# Patient Record
Sex: MALE | Race: WHITE | NOT HISPANIC OR LATINO | Employment: FULL TIME | URBAN - METROPOLITAN AREA
[De-identification: names, ages, dates, MRNs, and addresses within clinical notes are randomized per-mention and may not be internally consistent; named-entity substitution may affect disease eponyms.]

---

## 2017-09-14 ENCOUNTER — COMMUNICATION - HEALTHEAST (OUTPATIENT)
Dept: ADMINISTRATIVE | Facility: CLINIC | Age: 47
End: 2017-09-14

## 2017-09-15 ENCOUNTER — OFFICE VISIT - HEALTHEAST (OUTPATIENT)
Dept: FAMILY MEDICINE | Facility: CLINIC | Age: 47
End: 2017-09-15

## 2017-09-15 DIAGNOSIS — F17.211 CIGARETTE NICOTINE DEPENDENCE IN REMISSION: ICD-10-CM

## 2017-09-15 DIAGNOSIS — E66.811 OBESITY (BMI 30.0-34.9): ICD-10-CM

## 2017-09-15 DIAGNOSIS — Z00.00 ROUTINE GENERAL MEDICAL EXAMINATION AT A HEALTH CARE FACILITY: ICD-10-CM

## 2017-09-15 DIAGNOSIS — R05.9 COUGH: ICD-10-CM

## 2017-09-15 LAB
CHOLEST SERPL-MCNC: 198 MG/DL
FASTING STATUS PATIENT QL REPORTED: NO
HDLC SERPL-MCNC: 37 MG/DL
LDLC SERPL CALC-MCNC: 135 MG/DL
TRIGL SERPL-MCNC: 132 MG/DL

## 2017-09-15 ASSESSMENT — MIFFLIN-ST. JEOR: SCORE: 2007.02

## 2017-09-16 ENCOUNTER — COMMUNICATION - HEALTHEAST (OUTPATIENT)
Dept: FAMILY MEDICINE | Facility: CLINIC | Age: 47
End: 2017-09-16

## 2017-09-18 ENCOUNTER — COMMUNICATION - HEALTHEAST (OUTPATIENT)
Dept: FAMILY MEDICINE | Facility: CLINIC | Age: 47
End: 2017-09-18

## 2019-02-19 ENCOUNTER — TRANSFERRED RECORDS (OUTPATIENT)
Dept: HEALTH INFORMATION MANAGEMENT | Facility: CLINIC | Age: 49
End: 2019-02-19

## 2019-02-19 ENCOUNTER — OFFICE VISIT - HEALTHEAST (OUTPATIENT)
Dept: FAMILY MEDICINE | Facility: CLINIC | Age: 49
End: 2019-02-19

## 2019-02-19 DIAGNOSIS — Z00.00 ROUTINE GENERAL MEDICAL EXAMINATION AT A HEALTH CARE FACILITY: ICD-10-CM

## 2019-02-19 DIAGNOSIS — M75.51 BURSITIS OF RIGHT SHOULDER: ICD-10-CM

## 2019-02-19 DIAGNOSIS — E66.811 OBESITY (BMI 30.0-34.9): ICD-10-CM

## 2019-02-19 DIAGNOSIS — F17.210 CIGARETTE NICOTINE DEPENDENCE WITHOUT COMPLICATION: ICD-10-CM

## 2019-02-19 DIAGNOSIS — Z23 NEED FOR VACCINATION: ICD-10-CM

## 2019-02-19 DIAGNOSIS — R93.89 ABNORMAL FINDING ON CHEST XRAY: ICD-10-CM

## 2019-02-19 DIAGNOSIS — R73.9 HYPERGLYCEMIA: ICD-10-CM

## 2019-02-19 LAB
CHOLEST SERPL-MCNC: 224 MG/DL
FASTING STATUS PATIENT QL REPORTED: YES
FASTING STATUS PATIENT QL REPORTED: YES
GLUCOSE BLD-MCNC: 231 MG/DL (ref 70–99)
HDLC SERPL-MCNC: 41 MG/DL
LDLC SERPL CALC-MCNC: 148 MG/DL
LDLC SERPL CALC-MCNC: 148 MG/DL
TRIGL SERPL-MCNC: 173 MG/DL

## 2019-02-19 ASSESSMENT — MIFFLIN-ST. JEOR: SCORE: 2003.85

## 2019-02-20 LAB — HBA1C MFR BLD: 11.7 % (ref 3.5–6)

## 2019-02-25 ENCOUNTER — OFFICE VISIT - HEALTHEAST (OUTPATIENT)
Dept: FAMILY MEDICINE | Facility: CLINIC | Age: 49
End: 2019-02-25

## 2019-02-25 ENCOUNTER — COMMUNICATION - HEALTHEAST (OUTPATIENT)
Dept: FAMILY MEDICINE | Facility: CLINIC | Age: 49
End: 2019-02-25

## 2019-02-25 DIAGNOSIS — E11.65 TYPE 2 DIABETES MELLITUS WITH HYPERGLYCEMIA, WITHOUT LONG-TERM CURRENT USE OF INSULIN (H): ICD-10-CM

## 2019-02-25 DIAGNOSIS — E66.811 OBESITY (BMI 30.0-34.9): ICD-10-CM

## 2019-02-25 LAB
ANION GAP SERPL CALCULATED.3IONS-SCNC: 13 MMOL/L (ref 5–18)
BUN SERPL-MCNC: 14 MG/DL (ref 8–22)
CALCIUM SERPL-MCNC: 9.7 MG/DL (ref 8.5–10.5)
CHLORIDE BLD-SCNC: 104 MMOL/L (ref 98–107)
CO2 SERPL-SCNC: 23 MMOL/L (ref 22–31)
CREAT SERPL-MCNC: 0.97 MG/DL (ref 0.7–1.3)
CREAT UR-MCNC: 251.6 MG/DL
GFR SERPL CREATININE-BSD FRML MDRD: >60 ML/MIN/1.73M2
GLUCOSE BLD-MCNC: 157 MG/DL (ref 70–125)
MICROALBUMIN UR-MCNC: 1.9 MG/DL (ref 0–1.99)
MICROALBUMIN/CREAT UR: 7.6 MG/G
POTASSIUM BLD-SCNC: 3.8 MMOL/L (ref 3.5–5)
SODIUM SERPL-SCNC: 140 MMOL/L (ref 136–145)

## 2019-02-25 ASSESSMENT — MIFFLIN-ST. JEOR: SCORE: 2003.85

## 2019-06-17 ENCOUNTER — OFFICE VISIT - HEALTHEAST (OUTPATIENT)
Dept: FAMILY MEDICINE | Facility: CLINIC | Age: 49
End: 2019-06-17

## 2019-06-17 ENCOUNTER — TRANSFERRED RECORDS (OUTPATIENT)
Dept: HEALTH INFORMATION MANAGEMENT | Facility: CLINIC | Age: 49
End: 2019-06-17

## 2019-06-17 DIAGNOSIS — G89.29 CHRONIC LEFT SHOULDER PAIN: ICD-10-CM

## 2019-06-17 DIAGNOSIS — E11.65 TYPE 2 DIABETES MELLITUS WITH HYPERGLYCEMIA, WITHOUT LONG-TERM CURRENT USE OF INSULIN (H): ICD-10-CM

## 2019-06-17 DIAGNOSIS — M25.512 CHRONIC LEFT SHOULDER PAIN: ICD-10-CM

## 2019-06-17 DIAGNOSIS — Z12.11 SCREENING FOR COLON CANCER: ICD-10-CM

## 2019-06-17 LAB
HBA1C MFR BLD: 7 % (ref 3.5–6)
HBA1C MFR BLD: 7 % (ref 3.5–6)

## 2019-06-17 ASSESSMENT — MIFFLIN-ST. JEOR: SCORE: 1968.47

## 2019-06-24 ENCOUNTER — RECORDS - HEALTHEAST (OUTPATIENT)
Dept: ADMINISTRATIVE | Facility: OTHER | Age: 49
End: 2019-06-24

## 2019-07-01 ENCOUNTER — COMMUNICATION - HEALTHEAST (OUTPATIENT)
Dept: FAMILY MEDICINE | Facility: CLINIC | Age: 49
End: 2019-07-01

## 2019-07-01 ENCOUNTER — OFFICE VISIT - HEALTHEAST (OUTPATIENT)
Dept: PHYSICAL THERAPY | Facility: REHABILITATION | Age: 49
End: 2019-07-01

## 2019-07-01 DIAGNOSIS — E11.65 TYPE 2 DIABETES MELLITUS WITH HYPERGLYCEMIA, WITHOUT LONG-TERM CURRENT USE OF INSULIN (H): ICD-10-CM

## 2019-07-01 DIAGNOSIS — M25.512 LEFT SHOULDER PAIN: ICD-10-CM

## 2019-07-01 DIAGNOSIS — M25.612 DECREASED ROM OF LEFT SHOULDER: ICD-10-CM

## 2019-07-03 ENCOUNTER — COMMUNICATION - HEALTHEAST (OUTPATIENT)
Dept: FAMILY MEDICINE | Facility: CLINIC | Age: 49
End: 2019-07-03

## 2019-07-22 ENCOUNTER — OFFICE VISIT (OUTPATIENT)
Dept: FAMILY MEDICINE | Facility: CLINIC | Age: 49
End: 2019-07-22
Payer: COMMERCIAL

## 2019-07-22 VITALS
WEIGHT: 239 LBS | OXYGEN SATURATION: 96 % | DIASTOLIC BLOOD PRESSURE: 76 MMHG | SYSTOLIC BLOOD PRESSURE: 118 MMHG | HEART RATE: 75 BPM | TEMPERATURE: 97.6 F | RESPIRATION RATE: 16 BRPM

## 2019-07-22 DIAGNOSIS — M25.512 CHRONIC LEFT SHOULDER PAIN: Primary | ICD-10-CM

## 2019-07-22 DIAGNOSIS — E11.9 TYPE 2 DIABETES MELLITUS WITHOUT COMPLICATION, WITHOUT LONG-TERM CURRENT USE OF INSULIN (H): ICD-10-CM

## 2019-07-22 DIAGNOSIS — G89.29 CHRONIC LEFT SHOULDER PAIN: Primary | ICD-10-CM

## 2019-07-22 PROBLEM — F17.210 CIGARETTE NICOTINE DEPENDENCE WITHOUT COMPLICATION: Status: ACTIVE | Noted: 2017-09-19

## 2019-07-22 PROBLEM — E11.65 TYPE 2 DIABETES MELLITUS WITH HYPERGLYCEMIA, WITHOUT LONG-TERM CURRENT USE OF INSULIN (H): Status: ACTIVE | Noted: 2019-02-25

## 2019-07-22 PROBLEM — E66.811 OBESITY (BMI 30.0-34.9): Status: ACTIVE | Noted: 2017-09-19

## 2019-07-22 PROCEDURE — 99214 OFFICE O/P EST MOD 30 MIN: CPT | Performed by: FAMILY MEDICINE

## 2019-07-22 RX ORDER — ASPIRIN 81 MG/1
81 TABLET ORAL
COMMUNITY
Start: 2019-02-25 | End: 2022-10-27

## 2019-07-22 RX ORDER — ATORVASTATIN CALCIUM 10 MG/1
10 TABLET, FILM COATED ORAL DAILY
Refills: 3 | COMMUNITY
Start: 2019-05-29 | End: 2022-10-27

## 2019-07-22 RX ORDER — METFORMIN HCL 500 MG
TABLET, EXTENDED RELEASE 24 HR ORAL
Qty: 90 TABLET | Refills: 3 | Status: SHIPPED | OUTPATIENT
Start: 2019-07-22 | End: 2021-12-14

## 2019-07-22 RX ORDER — METFORMIN HCL 500 MG
TABLET, EXTENDED RELEASE 24 HR ORAL
Refills: 3 | COMMUNITY
Start: 2019-07-01 | End: 2019-07-22

## 2019-07-22 ASSESSMENT — PAIN SCALES - GENERAL: PAINLEVEL: MILD PAIN (2)

## 2019-07-22 ASSESSMENT — PATIENT HEALTH QUESTIONNAIRE - PHQ9
SUM OF ALL RESPONSES TO PHQ QUESTIONS 1-9: 6
5. POOR APPETITE OR OVEREATING: NOT AT ALL

## 2019-07-22 ASSESSMENT — ANXIETY QUESTIONNAIRES
3. WORRYING TOO MUCH ABOUT DIFFERENT THINGS: NOT AT ALL
GAD7 TOTAL SCORE: 0
1. FEELING NERVOUS, ANXIOUS, OR ON EDGE: NOT AT ALL
7. FEELING AFRAID AS IF SOMETHING AWFUL MIGHT HAPPEN: NOT AT ALL
IF YOU CHECKED OFF ANY PROBLEMS ON THIS QUESTIONNAIRE, HOW DIFFICULT HAVE THESE PROBLEMS MADE IT FOR YOU TO DO YOUR WORK, TAKE CARE OF THINGS AT HOME, OR GET ALONG WITH OTHER PEOPLE: NOT DIFFICULT AT ALL
5. BEING SO RESTLESS THAT IT IS HARD TO SIT STILL: NOT AT ALL
6. BECOMING EASILY ANNOYED OR IRRITABLE: NOT AT ALL
2. NOT BEING ABLE TO STOP OR CONTROL WORRYING: NOT AT ALL

## 2019-07-22 NOTE — NURSING NOTE
Chief Complaint   Patient presents with     Establish Care     He had perviously been seen at [facility] clinic in [place]      Shoulder Pain     intermittent ache/left shoulder pain, limited mobility. x5 months, no known injury. Current pain score 2/10       Initial /76   Pulse 75   Temp 97.6  F (36.4  C) (Tympanic)   Resp 16   Wt 108.4 kg (239 lb)   SpO2 96%  There is no height or weight on file to calculate BMI.    Medication Reconciliation: complete  Kanchan Brewster MA

## 2019-07-22 NOTE — PROGRESS NOTES
Subjective     Joe Lima is a 49 year old male who presents to clinic today for the following health issues:    Patient is a 49-year-old male who comes in today to establish care.  He has a past medical history significant for type 2 diabetes which was diagnosed earlier on this year.  He is on metformin 500 mg daily and his last A1c was done June 2019 and this  7.1.      He comes in today for chronic left shoulder pain. This pain has been ongoing for months. He reports reduced range of motion.  He does not recall any trauma.  Sleeping on his left shoulder has become problematic for him at night.  He occasionally has numbness and tingling down his arm.  He did mention that he had a mild injury few months ago when he was grilling and the drill burned his hand making his hand willa backwards and he heard a  snap.  No swelling or confusion of the shoulder.    Patient is otherwise healthy he is also requesting refills on his metformin he has tolerated this thus far.    Chief Complaint   Patient presents with     [facility] Care     He had perviously been seen at [facility] clinic in [place]      Shoulder Pain     intermittent ache/left shoulder pain, limited mobility. x5 months, no known injury. Current pain score 2/10     Shoulder Pain    Onset: x5 months     Description:   Location: left shoulder   Character: Sharp and Dull ache    Intensity: moderate, 2/10    Progression of Symptoms: same    Accompanying Signs & Symptoms:  Other symptoms: none    History:   Previous similar pain: no       Precipitating factors:   Trauma or overuse: no     Alleviating factors:  Improved by: nothing    Therapies Tried and outcome: ibuprofen         Patient Active Problem List   Diagnosis     Bursitis of right shoulder     Cigarette nicotine dependence without complication     Obesity (BMI 30.0-34.9)     Type 2 diabetes mellitus with hyperglycemia, without long-term current use of insulin (H)     History reviewed. No pertinent  surgical history.    Social History     Tobacco Use     Smoking status: Current Every Day Smoker     Types: Other     Smokeless tobacco: Current User   Substance Use Topics     Alcohol use: Yes     History reviewed. No pertinent family history.      Current Outpatient Medications   Medication Sig Dispense Refill     aspirin 81 MG EC tablet Take 81 mg by mouth       metFORMIN (GLUCOPHAGE-XR) 500 MG 24 hr tablet TAKE 1 TABLET BY MOUTH ONCE DAILY WITH BREAKFAST 90 tablet 3     atorvastatin (LIPITOR) 10 MG tablet Take 10 mg by mouth daily  3     No Known Allergies  No lab results found.   BP Readings from Last 3 Encounters:   07/22/19 118/76    Wt Readings from Last 3 Encounters:   07/22/19 108.4 kg (239 lb)                      Reviewed and updated as needed this visit by Provider  Tobacco  Allergies  Meds  Problems  Med Hx  Surg Hx  Fam Hx       Review of Systems   ROS COMP: Constitutional, HEENT, cardiovascular, pulmonary, gi and gu systems are negative, except as otherwise noted.      Objective    /76   Pulse 75   Temp 97.6  F (36.4  C) (Tympanic)   Resp 16   Wt 108.4 kg (239 lb)   SpO2 96%   There is no height or weight on file to calculate BMI.  Physical Exam   GENERAL: healthy, alert and no distress  EYES: Eyes grossly normal to inspection, PERRL and conjunctivae and sclerae normal  HENT: ear canals and TM's normal, nose and mouth without ulcers or lesions  NECK: no adenopathy, no asymmetry, masses, or scars and thyroid normal to palpation  RESP: lungs clear to auscultation - no rales, rhonchi or wheezes  CV: regular rate and rhythm, normal S1 S2, no S3 or S4, no murmur, click or rub, no peripheral edema and peripheral pulses strong  MS: decreased range of motion of left shoulder     Diagnostic Test Results:    Pending         Assessment & Plan     1. Chronic left shoulder pain  MRI ordered. Patient will be notified of results   - MR Shoulder Left w/o Contrast; Future    2. Type 2 diabetes  mellitus without complication, without long-term current use of insulin (H)  Medication refilled   - **A1C FUTURE 3mo; Future  - metFORMIN (GLUCOPHAGE-XR) 500 MG 24 hr tablet; TAKE 1 TABLET BY MOUTH ONCE DAILY WITH BREAKFAST  Dispense: 90 tablet; Refill: 3           FUTURE APPOINTMENTS:       - Follow-up visit in 2-3 months   There are no Patient Instructions on file for this visit.    Return in about 2 months (around 9/22/2019) for Lab Work.    Javier Avila MD  [facility] [place]

## 2019-07-23 ASSESSMENT — ANXIETY QUESTIONNAIRES: GAD7 TOTAL SCORE: 0

## 2019-07-29 ENCOUNTER — COMMUNICATION - HEALTHEAST (OUTPATIENT)
Dept: PHYSICAL THERAPY | Facility: REHABILITATION | Age: 49
End: 2019-07-29

## 2019-08-26 ENCOUNTER — COMMUNICATION - HEALTHEAST (OUTPATIENT)
Dept: PHYSICAL THERAPY | Facility: REHABILITATION | Age: 49
End: 2019-08-26

## 2020-04-14 ENCOUNTER — OFFICE VISIT - HEALTHEAST (OUTPATIENT)
Dept: FAMILY MEDICINE | Facility: CLINIC | Age: 50
End: 2020-04-14

## 2020-04-14 DIAGNOSIS — R05.9 COUGH: ICD-10-CM

## 2020-04-15 ENCOUNTER — RECORDS - HEALTHEAST (OUTPATIENT)
Dept: LAB | Facility: CLINIC | Age: 50
End: 2020-04-15

## 2020-04-15 LAB
SARS-COV-2 PCR COMMENT: NORMAL
SARS-COV-2 RNA SPEC QL NAA+PROBE: NEGATIVE
SARS-COV-2 VIRUS SPECIMEN SOURCE: NORMAL

## 2020-05-26 ENCOUNTER — COMMUNICATION - HEALTHEAST (OUTPATIENT)
Dept: FAMILY MEDICINE | Facility: CLINIC | Age: 50
End: 2020-05-26

## 2020-12-09 ENCOUNTER — OFFICE VISIT - HEALTHEAST (OUTPATIENT)
Dept: FAMILY MEDICINE | Facility: CLINIC | Age: 50
End: 2020-12-09

## 2020-12-09 DIAGNOSIS — F17.210 CIGARETTE NICOTINE DEPENDENCE WITHOUT COMPLICATION: ICD-10-CM

## 2020-12-09 DIAGNOSIS — E11.65 TYPE 2 DIABETES MELLITUS WITH HYPERGLYCEMIA, WITHOUT LONG-TERM CURRENT USE OF INSULIN (H): ICD-10-CM

## 2020-12-09 DIAGNOSIS — E66.811 OBESITY (BMI 30.0-34.9): ICD-10-CM

## 2020-12-09 DIAGNOSIS — N48.89 PENILE CURVATURE, ACQUIRED: ICD-10-CM

## 2020-12-09 LAB
ALBUMIN SERPL-MCNC: 4.4 G/DL (ref 3.5–5)
ALP SERPL-CCNC: 76 U/L (ref 45–120)
ALT SERPL W P-5'-P-CCNC: 43 U/L (ref 0–45)
ANION GAP SERPL CALCULATED.3IONS-SCNC: 9 MMOL/L (ref 5–18)
AST SERPL W P-5'-P-CCNC: 25 U/L (ref 0–40)
BILIRUB SERPL-MCNC: 0.6 MG/DL (ref 0–1)
BUN SERPL-MCNC: 16 MG/DL (ref 8–22)
CALCIUM SERPL-MCNC: 9.4 MG/DL (ref 8.5–10.5)
CHLORIDE BLD-SCNC: 105 MMOL/L (ref 98–107)
CHOLEST SERPL-MCNC: 212 MG/DL
CO2 SERPL-SCNC: 26 MMOL/L (ref 22–31)
CREAT SERPL-MCNC: 1.02 MG/DL (ref 0.7–1.3)
CREAT UR-MCNC: 181.7 MG/DL
FASTING STATUS PATIENT QL REPORTED: YES
GFR SERPL CREATININE-BSD FRML MDRD: >60 ML/MIN/1.73M2
GLUCOSE BLD-MCNC: 143 MG/DL (ref 70–125)
HBA1C MFR BLD: 7.1 %
HDLC SERPL-MCNC: 38 MG/DL
LDLC SERPL CALC-MCNC: 143 MG/DL
MICROALBUMIN UR-MCNC: 1.12 MG/DL (ref 0–1.99)
MICROALBUMIN/CREAT UR: 6.2 MG/G
POTASSIUM BLD-SCNC: 4.5 MMOL/L (ref 3.5–5)
PROT SERPL-MCNC: 7.4 G/DL (ref 6–8)
SODIUM SERPL-SCNC: 140 MMOL/L (ref 136–145)
TRIGL SERPL-MCNC: 156 MG/DL

## 2020-12-09 ASSESSMENT — MIFFLIN-ST. JEOR: SCORE: 1977.54

## 2021-05-30 NOTE — TELEPHONE ENCOUNTER
Spoke with pt who states he has not had an Xray done on his shoulder. Pt requested 7/15 appt after 1230. Pt was scheduled on 7/15 at 1340 with DE.

## 2021-05-30 NOTE — TELEPHONE ENCOUNTER
RN cannot approve Refill Request    RN can NOT refill this medication overdue for office visits and/or labs.    Marcelino Frias, [facility] Triage/Med Refill 7/1/2019    Requested Prescriptions   Pending Prescriptions Disp Refills     metFORMIN (GLUCOPHAGE-XR) 500 MG 24 hr tablet [Pharmacy Med Name: MetFORMIN ER 500MG  TAB] 30 tablet 3     Sig: TAKE 1 TABLET BY MOUTH ONCE DAILY WITH BREAKFAST       Metformin Refill Protocol Failed - 7/1/2019  1:31 PM        Failed - LFT or AST or ALT in last 12 months     No results found for: ALBUMIN, BILITOT, BILIDIR, ALKPHOS, AST, ALT, PROT             Passed - Blood pressure in last 12 months     BP Readings from Last 1 Encounters:   06/17/19 128/76             Passed - GFR or Serum Creatinine in last 6 months     GFR MDRD Non Af Amer   Date Value Ref Range Status   02/25/2019 >60 >60 mL/min/1.73m2 Final     GFR MDRD Af Amer   Date Value Ref Range Status   02/25/2019 >60 >60 mL/min/1.73m2 Final             Passed - Visit with PCP or prescribing provider visit in last 6 months or next 3 months     Last office visit with prescriber/PCP: 6/17/2019 OR same dept: 6/17/2019 Marge Brewster MD OR same specialty: 6/17/2019 Marge Brewster MD Last physical: 2/19/2019 Last MTM visit: Visit date not found         Next appt within 3 mo: Visit date not found  Next physical within 3 mo: Visit date not found  Prescriber OR PCP: Marge Brewster MD  Last diagnosis associated with med order: There are no diagnoses linked to this encounter.   If protocol passes may refill for 12 months if within 3 months of last provider visit (or a total of 15 months).           Passed - A1C in last 6 months     Hemoglobin A1c   Date Value Ref Range Status   06/17/2019 7.0 (H) 3.5 - 6.0 % Final               Passed - Microalbumin in last year      Microalbumin, Random Urine   Date Value Ref Range Status   02/25/2019 1.90 0.00 - 1.99 mg/dL Final

## 2021-05-31 VITALS — BODY MASS INDEX: 33.41 KG/M2 | HEIGHT: 72 IN | WEIGHT: 246.7 LBS

## 2021-05-31 NOTE — TELEPHONE ENCOUNTER
Left a message for Joe regarding today's NS (#1) I informed him that he does not have other appointments scheduled. I gave him our phone if had any questions.   Jacinta Werner PTA,CLT

## 2021-06-02 VITALS — HEIGHT: 72 IN | WEIGHT: 246 LBS | BODY MASS INDEX: 33.32 KG/M2

## 2021-06-02 VITALS — WEIGHT: 246 LBS | BODY MASS INDEX: 33.32 KG/M2 | HEIGHT: 72 IN

## 2021-06-03 VITALS — WEIGHT: 238.2 LBS | BODY MASS INDEX: 32.26 KG/M2 | HEIGHT: 72 IN

## 2021-06-05 VITALS
BODY MASS INDEX: 32.53 KG/M2 | WEIGHT: 240.2 LBS | SYSTOLIC BLOOD PRESSURE: 125 MMHG | DIASTOLIC BLOOD PRESSURE: 81 MMHG | HEART RATE: 81 BPM | HEIGHT: 72 IN

## 2021-06-07 NOTE — PROGRESS NOTES
SUBJECTIVE: Here for curbside evaluation for COVID-19 referred through [facility]. Confirmed [facility] with name and date of birth for specimen collection.         1. Cough       Instructed patient to follow up with [facility] for return to work process. If questions or concerns arise advised to follow up with [facility] team.     Kary Rangel

## 2021-06-08 NOTE — TELEPHONE ENCOUNTER
Left message to call back for: Joe  Information to relay to patient:  MINALTCANNE MARIE. Please help pt schedule virtual visit to follow on DM when he calls back.

## 2021-06-13 NOTE — PROGRESS NOTES
Assessment/Plan:     1. Routine general medical examination at a health care facility  Routine history and physical, updated in EMR.  Labs updated as below.  Immunizations are up-to-date per patient.  Plan repeat physical in 1 year.  - Lipid Cascade RANDOM    2. Cough  Likely viral respiratory infection.  Discussed with patient that there is a small area in the lung that we should recheck with another x-ray in 4-6 weeks.  This is possibly a blood vessel on end versus a lymph node versus a small mass.  He will continue to treat symptomatically and discussed over-the-counter medications for cough that he can use.  - XR Chest PA and Lateral  - HM1(CBC and Differential)  - HM1 (CBC with Diff)    3. Cigarette nicotine dependence in remission  Patient recently quit smoking when he became ill.  Congratulated his efforts thus far and recommended continued cessation.  He does not desire any pharmacologic assistance in this regard.  I have counseled the patient for tobacco cessation and the follow up will occur  at the next visit.    4. Obesity (BMI 30.0-34.9)  Discussed a healthy, low carb, low sugar diet and regular cardiovascular exercise.  The following high BMI interventions were performed this visit: encouragement to exercise, weight monitoring, special diet education and lifestyle education regarding diet      Subjective:      Joe Lima is a 47 y.o. male who presents for an annual exam. The patient reports that there is not domestic violence in his life.     Sick for the past 3 days, started with sore throat and nasal congestion/pain.  Drinking lots of fluids, orange juice.  Sinuses feel better, but now with cough and shortness of breath when lying flat.  Mostly non-productive cough.  No history of asthma.    Healthy Habits:   Regular Exercise: No  Sunscreen Use: No  Healthy Diet: sometimes  Dental Visits Regularly: No  Seat Belt: Yes  Sexually active: No  Monthly Self Testicular Exams:  No  Lipid Profile:  No  Glucose Screen: No  Prevention of Osteoporosis: Yes        There is no immunization history on file for this patient.  Immunization status: missing doses of MMR, Tdap.    No exam data present    No current outpatient prescriptions on file.     No current facility-administered medications for this visit.      History reviewed. No pertinent past medical history.  Past Surgical History:   Procedure Laterality Date     arm fracture Left 1977     LUMBAR DISCECTOMY  2005     Review of patient's allergies indicates no known allergies.  Family History   Problem Relation Age of Onset     No Medical Problems Mother      No Medical Problems Father      Cancer Maternal Aunt      Breast cancer Neg Hx      Colon cancer Neg Hx      Prostate cancer Neg Hx      Diabetes Neg Hx      Heart disease Neg Hx      Social History     Social History     Marital status: Single     Spouse name: N/A     Number of children: N/A     Years of education: N/A     Occupational History     Not on file.     Social History Main Topics     Smoking status: Former Smoker     Packs/day: 0.50     Years: 30.00     Quit date: 9/12/2017     Smokeless tobacco: Never Used     Alcohol use 0.0 - 0.6 oz/week     0 - 1 Cans of beer per week      Comment: rare     Drug use: No     Sexual activity: Not Currently     Partners: Female     Other Topics Concern     Not on file     Social History Narrative     No narrative on file       Review of Systems  General:  Denies problem  Eyes: Denies problem  Ears/Nose/Throat: Sinus congestion, nasal drainage  Cardiovascular: Denies problem  Respiratory:  Cough as above  Gastrointestinal:  Denies problem  Genitourinary: Denies problem  Musculoskeletal:  Denies problem  Skin: Denies problem  Neurologic: Denies problem  Psychiatric: Denies problem  Endocrine: Denies problem  Heme/Lymphatic: Denies problem   Allergic/Immunologic: Denies problem        Objective:     Vitals:    09/15/17 1335   BP: 128/86   Pulse: 96   Resp: 24    Temp: 98  F (36.7  C)   TempSrc: Oral   SpO2: 97%   Weight: (!) 246 lb 11.2 oz (111.9 kg)   Height: 6' (1.829 m)     Body mass index is 33.46 kg/(m^2).    Physical  General Appearance: Alert, cooperative, no distress, appears stated age  Head: Normocephalic, without obvious abnormality, atraumatic  Eyes: PERRL, conjunctiva/corneas clear, EOM's intact  Ears: Normal TM's and external ear canals, both ears  Nose: Nares normal, septum midline,mucosa normal, no drainage  Throat: Lips, mucosa, and tongue normal; teeth and gums normal  Neck: Supple, symmetrical, trachea midline, no adenopathy; thyroid: not enlarged, symmetric, no tenderness/mass/nodules; no carotid bruit or JVD  Back: Symmetric, no curvature, ROM normal, no CVA tenderness  Lungs: Clear to auscultation bilaterally, respirations unlabored  Heart: Regular rate and rhythm, S1 and S2 normal, no murmur, rub, or gallop  Abdomen: Soft, non-tender, bowel sounds active all four quadrants,  no masses, no organomegaly  Genitourinary: Penis normal. Right testis is descended. Left testis is descended.   No scrotal masses palpated, prostate is mildly enlarged without nodularity or asymmetry.  Musculoskeletal: Normal range of motion. No joint swelling or deformity.   Extremities: Extremities normal, atraumatic, no cyanosis or edema  Skin: Skin color, texture, turgor normal, no rashes or lesions  Lymph nodes: Cervical, supraclavicular, and axillary nodes normal  Neurologic: He is alert. He has normal reflexes.   Psychiatric: He has a normal mood and affect.

## 2021-06-13 NOTE — PROGRESS NOTES
Assessment/Plan:       1. Type 2 diabetes mellitus with hyperglycemia, without long-term current use of insulin (H)  Patient's A1c is actually stable compared with previous when he was taking his Metformin.  I still recommended restarting Metformin.  Congratulated patient on his lifestyle changes.  Also recommended restarting statin and baby aspirin.  Labs updated as below.  Recommended follow-up in 6 months.  - metFORMIN (GLUCOPHAGE-XR) 500 MG 24 hr tablet; TAKE 1 TABLET BY MOUTH ONCE DAILY WITH BREAKFAST  Dispense: 90 tablet; Refill: 3  - aspirin 81 MG EC tablet; Take 1 tablet (81 mg total) by mouth daily.  Dispense: 150 tablet; Refill: 2  - atorvastatin (LIPITOR) 10 MG tablet; Take 1 tablet (10 mg total) by mouth daily.  Dispense: 90 tablet; Refill: 3  - Glycosylated Hemoglobin A1c  - Comprehensive Metabolic Panel  - Microalbumin, Random Urine  - Lipid Cascade RANDOM    2. Obesity (BMI 30.0-34.9)  Discussed a healthy, low-carb, low sugar diet and regular cardiovascular exercise.  Discussed that Metformin can be beneficial in terms of metabolism as well.  - atorvastatin (LIPITOR) 10 MG tablet; Take 1 tablet (10 mg total) by mouth daily.  Dispense: 90 tablet; Refill: 3  - Comprehensive Metabolic Panel  - Lipid Cascade RANDOM    3. Cigarette nicotine dependence without complication  Discussed the importance of cessation.  Patient is contemplative.  He does not desire any pharmacologic assistance.    4. Penile curvature, acquired  I am unable to appreciate this on examination today, but patient has a concern for Peyronie's disease.  Recommended referral to urology for further evaluation.  - Ambulatory referral to Urology        Subjective:       Joe Lima is a 50 y.o. male who presents for diabetes follow-up and to discuss acquired penile curvature.  It has been about 1.5 years since I last saw the patient.  He states he is not currently taking any medications.  He has been trying to eat healthier, and has  "lost 4 pounds when compared to 2 years ago.  He works as a .  He states he has cut back on smoking, and tries to walk on a treadmill regularly.  He denies any polyuria or polydipsia.  When I last saw him, he was diagnosed with type 2 diabetes mellitus and started on Metformin, statin and baby aspirin.  Again, he is not currently taking any of these medications.  Lastly, he reports that he has had some erectile issues.  He thinks he might have Peyronie's disease after looking this up online.  He feels as though he can feel a \"plaque\" on the dorsal aspect of his penis and feels that this curves upward.  He denies any concern for sexually transmitted infections.  He denies any known injury, woke up with this sensation.    Labs Reviewed:  Lab Results   Component Value Date    WBC 8.5 09/15/2017    HGB 16.7 09/15/2017    HCT 49.0 09/15/2017     09/15/2017     Ref Range & Units 6/17/19 1109      Hemoglobin A1c 3.5 - 6.0 % 7.0High         Ref Range & Units 2/25/19 1201    Sodium 136 - 145 mmol/L 140     Potassium 3.5 - 5.0 mmol/L 3.8     Chloride 98 - 107 mmol/L 104     CO2 22 - 31 mmol/L 23     Anion Gap, Calculation 5 - 18 mmol/L 13     Glucose 70 - 125 mg/dL 157High      Calcium 8.5 - 10.5 mg/dL 9.7     BUN 8 - 22 mg/dL 14     Creatinine 0.70 - 1.30 mg/dL 0.97     GFR MDRD Af Amer >60 mL/min/1.73m2 >60     GFR MDRD Non Af Amer >60 mL/min/1.73m2 >60          The following portions of the patient's history were reviewed and updated as appropriate: allergies, current medications, past medical history, past social history and problem list.      Current Outpatient Medications:      aspirin 81 MG EC tablet, Take 1 tablet (81 mg total) by mouth daily., Disp: 150 tablet, Rfl: 2     atorvastatin (LIPITOR) 10 MG tablet, Take 1 tablet (10 mg total) by mouth daily., Disp: 90 tablet, Rfl: 3     metFORMIN (GLUCOPHAGE-XR) 500 MG 24 hr tablet, TAKE 1 TABLET BY MOUTH ONCE DAILY WITH BREAKFAST, Disp: 30 tablet, Rfl: " 3    Review of Systems   A 12 point comprehensive review of systems was negative except as noted.      Objective:      /81 (Patient Site: Left Arm, Patient Position: Sitting, Cuff Size: Adult Regular)   Pulse 81   Ht 6' (1.829 m)   Wt (!) 240 lb 3.2 oz (109 kg)   BMI 32.58 kg/m      General appearance: alert, appears stated age and cooperative  Head: Normocephalic, without obvious abnormality, atraumatic  Eyes: conjunctivae clear, sclerae anicteric  Lungs: clear to auscultation bilaterally  Heart: regular rate and rhythm, S1, S2 normal, no murmur, click, rub or gallop  Male genitalia: penis appears normal, I don't appreciate any abnormal curvature or plaques on today's examination; testes descended, no scrotal masses palpated  Extremities: extremities normal, atraumatic, no cyanosis or edema  Neurologic: Grossly normal        Results for orders placed or performed in visit on 12/09/20   Glycosylated Hemoglobin A1c   Result Value Ref Range    Hemoglobin A1c 7.1 (H) <=5.6 %   Comprehensive Metabolic Panel   Result Value Ref Range    Sodium 140 136 - 145 mmol/L    Potassium 4.5 3.5 - 5.0 mmol/L    Chloride 105 98 - 107 mmol/L    CO2 26 22 - 31 mmol/L    Anion Gap, Calculation 9 5 - 18 mmol/L    Glucose 143 (H) 70 - 125 mg/dL    BUN 16 8 - 22 mg/dL    Creatinine 1.02 0.70 - 1.30 mg/dL    GFR MDRD Af Amer >60 >60 mL/min/1.73m2    GFR MDRD Non Af Amer >60 >60 mL/min/1.73m2    Bilirubin, Total 0.6 0.0 - 1.0 mg/dL    Calcium 9.4 8.5 - 10.5 mg/dL    Protein, Total 7.4 6.0 - 8.0 g/dL    Albumin 4.4 3.5 - 5.0 g/dL    Alkaline Phosphatase 76 45 - 120 U/L    AST 25 0 - 40 U/L    ALT 43 0 - 45 U/L   Microalbumin, Random Urine   Result Value Ref Range    Microalbumin, Random Urine 1.12 0.00 - 1.99 mg/dL    Creatinine, Urine 181.7 mg/dL    Microalbumin/Creatinine Ratio Random Urine 6.2 <=19.9 mg/g   Lipid Cascade RANDOM   Result Value Ref Range    Cholesterol 212 (H) <=199 mg/dL    Triglycerides 156 (H) <=149 mg/dL    HDL  Cholesterol 38 (L) >=40 mg/dL    LDL Calculated 143 (H) <=129 mg/dL    Patient Fasting > 8hrs? Yes

## 2021-06-17 NOTE — PATIENT INSTRUCTIONS - HE
Patient Instructions by Ira Lema PT at 7/1/2019 10:00 AM     Author: Ira Lema PT Service: -- Author Type: Physical Therapist    Filed: 7/1/2019 11:49 AM Encounter Date: 7/1/2019 Status: Signed    : Ira Lema PT (Physical Therapist)

## 2021-06-24 NOTE — PATIENT INSTRUCTIONS - HE
Keep up the good work with a low carbohydrate, low sugar diet and regular cardiovascular exercise.    Plan follow-up in 3 months for recheck.

## 2021-06-24 NOTE — TELEPHONE ENCOUNTER
Notified pharmacy per Dr. Brewster, the prescription for the lisinopril is to be cancelled. Pharmacy stated understanding.

## 2021-06-24 NOTE — PROGRESS NOTES
Assessment/Plan:       1. Type 2 diabetes mellitus with hyperglycemia, without long-term current use of insulin (H)  This is a new diagnosis for the patient.  He appears very motivated to make lifestyle changes, has already made some significant diet changes.  He is willing to try metformin along with a statin and baby aspirin for heart disease prevention.  Will not ask him to check his blood sugars at this time, but plan follow-up in 3 months for recheck of A1c.  - Microalbumin, Random Urine  - Basic Metabolic Panel    2. Obesity (BMI 30.0-34.9)  Discussed a healthy, low-carb, low sugar diet and regular cardiovascular exercise.  The following high BMI interventions were performed this visit: encouragement to exercise, weight monitoring, special diet education and lifestyle education regarding diet        Subjective:       Joe Lima is a 48 y.o. male who presents for a discussion of a new diagnosis of type II diabetes mellitus.  This was found on screening lab work performed at his recent complete physical exam.  He says that in retrospect, he has noticed some excessive thirst and frequent urination.  He otherwise has been feeling well.  Since being diagnosed with diabetes mellitus, he says he has cut back significantly on his intake of pop and what little he drinks is now diet.  He has gone grocery shopping to find healthier items, and plans to be more active.  He is very motivated to make some lifestyle changes.  He otherwise has been feeling well and has no new questions or concerns today.      Labs Reviewed:  Lab Results   Component Value Date    WBC 8.5 09/15/2017    HGB 16.7 09/15/2017    HCT 49.0 09/15/2017     09/15/2017    CHOL 224 (H) 02/19/2019    TRIG 173 (H) 02/19/2019    HDL 41 02/19/2019    HGBA1C 11.7 (H) 02/19/2019       The following portions of the patient's history were reviewed and updated as appropriate: allergies, current medications, past medical history, past social history and  problem list.    No current outpatient medications on file.    Review of Systems   A 12 point comprehensive review of systems was negative except as noted.      Objective:      /74 (Patient Site: Left Arm, Patient Position: Sitting, Cuff Size: Adult Regular)   Pulse 88   Resp 20   Ht 6' (1.829 m)   Wt (!) 246 lb (111.6 kg)   BMI 33.36 kg/m      General appearance: alert, appears stated age and cooperative  Head: Normocephalic, without obvious abnormality, atraumatic  Eyes: conjunctivae clear, sclerae anicteric  Lungs: clear to auscultation bilaterally  Heart: regular rate and rhythm, S1, S2 normal, no murmur, click, rub or gallop  Extremities: extremities normal, atraumatic, no cyanosis or edema  Neurologic: Grossly normal, monofilament foot exam normal bilaterally        Results for orders placed or performed in visit on 02/25/19   Microalbumin, Random Urine   Result Value Ref Range    Microalbumin, Random Urine 1.90 0.00 - 1.99 mg/dL    Creatinine, Urine 251.6 mg/dL    Microalbumin/Creatinine Ratio Random Urine 7.6 <=19.9 mg/g   Basic Metabolic Panel   Result Value Ref Range    Sodium 140 136 - 145 mmol/L    Potassium 3.8 3.5 - 5.0 mmol/L    Chloride 104 98 - 107 mmol/L    CO2 23 22 - 31 mmol/L    Anion Gap, Calculation 13 5 - 18 mmol/L    Glucose 157 (H) 70 - 125 mg/dL    Calcium 9.7 8.5 - 10.5 mg/dL    BUN 14 8 - 22 mg/dL    Creatinine 0.97 0.70 - 1.30 mg/dL    GFR MDRD Af Amer >60 >60 mL/min/1.73m2    GFR MDRD Non Af Amer >60 >60 mL/min/1.73m2

## 2021-06-24 NOTE — PROGRESS NOTES
"Assessment/Plan:     1. Routine general medical examination at a health care facility  Routine history and physical, updated in EMR.  Fasting labs updated as below.  Immunizations updated today.  Patient defers digital rectal exam for prostate screen until next year.  Plan repeat physical in 1 year.    2. Obesity (BMI 30.0-34.9)  Discussed a healthy, low carb, low sugar diet and regular cardiovascular exercise.  Patient is pre-contemplative about these lifestyle changes at present.  The following high BMI interventions were performed this visit: encouragement to exercise, weight monitoring, special diet education and lifestyle education regarding diet  - Lipid Cascade FASTING  - Glucose    3. Cigarette nicotine dependence without complication  Patient has gone back to smoking.  He does desire assistance with cessation, but does not wish to try Chantix.  We also discussed Zyban.  He would like to try nicotine gum.  This is not covered by his insurance, he will buy this over-the-counter.  I have counseled the patient for tobacco cessation and the follow up will occur  at the next visit.    4. Abnormal finding on chest xray  Patient had a perihilar density on his chest x-ray when we last saw him.  At that time he was having a cough.  This is asymptomatic.  Advised recheck and if this is still present, further imaging with possibly CT scan.  We will inform patient of this result when it returns, patient did not wish to stay to review this result.  - XR Chest 2 Views    5. Bursitis, right shoulder  Patient has had a swollen, what feels like subacromial bursa sac for the past 10 years.  This is not particularly bothersome but occasionally feels like a \"pressure\" sensation.  Recommended referral to orthopedics to see if they would be willing to try draining this.    6. Need for vaccination  - Tdap vaccine,  8yo or older,  IM      Subjective:      Joe Lima is a 48 y.o. male who presents for an annual exam. The patient " reports that there is not domestic violence in his life.  Patient overall has been feeling well.  He does mention that his previous rubella titer returned not immune at his preemployment physical.  He also has a lump on his right shoulder that has been present for about 10 years.  It does not seem to be enlarging, is not particularly painful or bothersome.    Healthy Habits:   Regular Exercise: No  Sunscreen Use: No  Healthy Diet: No  Dental Visits Regularly: No  Seat Belt: Yes  Sexually active: No  Monthly Self Testicular Exams:  Yes  Lipid Profile: Yes  Glucose Screen: Yes  Prevention of Osteoporosis: No        There is no immunization history on file for this patient.  Immunization status: missing doses of MMR, Tdap.    No exam data present    No current outpatient medications on file.     No current facility-administered medications for this visit.      No past medical history on file.  Past Surgical History:   Procedure Laterality Date     arm fracture Left 1977     LUMBAR DISCECTOMY  2005     Patient has no known allergies.  Family History   Problem Relation Age of Onset     No Medical Problems Mother      No Medical Problems Father      Cancer Maternal Aunt      Breast cancer Neg Hx      Colon cancer Neg Hx      Prostate cancer Neg Hx      Diabetes Neg Hx      Heart disease Neg Hx      Social History     Socioeconomic History     Marital status: Single     Spouse name: Not on file     Number of children: Not on file     Years of education: Not on file     Highest education level: Not on file   Social Needs     Financial resource strain: Not on file     Food insecurity - worry: Not on file     Food insecurity - inability: Not on file     Transportation needs - medical: Not on file     Transportation needs - non-medical: Not on file   Occupational History     Not on file   Tobacco Use     Smoking status: Former Smoker     Packs/day: 0.50     Years: 30.00     Pack years: 15.00     Last attempt to quit: 9/12/2017      Years since quittin.4     Smokeless tobacco: Never Used   Substance and Sexual Activity     Alcohol use: Yes     Alcohol/week: 0.0 - 0.6 oz     Comment: rare     Drug use: No     Sexual activity: Not Currently     Partners: Female   Other Topics Concern     Not on file   Social History Narrative     Not on file       Review of Systems  General:  Denies problem  Eyes: Denies problem  Ears/Nose/Throat: Denies problem  Cardiovascular: Denies problem  Respiratory:  Denies problem  Gastrointestinal:  Denies problem  Genitourinary: Denies problem  Musculoskeletal:  lump right shoulder  Skin: Denies problem  Neurologic: Denies problem  Psychiatric: Denies problem  Endocrine: Denies problem  Heme/Lymphatic: Denies problem   Allergic/Immunologic: Denies problem        Objective:     Vitals:    19 1046   BP: 120/80   Pulse: 88   Resp: 20   Weight: (!) 246 lb (111.6 kg)   Height: 6' (1.829 m)     Body mass index is 33.36 kg/m .    Physical  General Appearance: Alert, cooperative, no distress, appears stated age  Head: Normocephalic, without obvious abnormality, atraumatic  Eyes: PERRL, conjunctiva/corneas clear, EOM's intact  Ears: Normal TM's and external ear canals, both ears  Nose: Nares normal, septum midline, mucosa normal, no drainage  Throat: Lips, mucosa, and tongue normal; teeth and gums normal  Neck: Supple, symmetrical, trachea midline, no adenopathy; thyroid: not enlarged, symmetric, no tenderness/mass/nodules; no carotid bruit or JVD  Back: Symmetric, no curvature, ROM normal, no CVA tenderness  Lungs: Clear to auscultation bilaterally, respirations unlabored  Heart: Regular rate and rhythm, S1 and S2 normal, no murmur, rub, or gallop  Abdomen: Soft, non-tender, bowel sounds active all four quadrants, no masses, no organomegaly  Genitourinary: Patient refused.   Musculoskeletal: Normal range of motion.  Large protuberant fluctuant mass right shoulder consistent with inflamed subacromial bursa.  No  warmth or erythema.   Extremities: Extremities normal, atraumatic, no cyanosis or edema  Skin: Skin color, texture, turgor normal, no rashes or lesions  Lymph nodes: Cervical, supraclavicular, and axillary nodes normal  Neurologic: He is alert. He has normal reflexes.   Psychiatric: He has a normal mood and affect.

## 2021-06-27 NOTE — PROGRESS NOTES
Progress Notes by Ira Lema PT at 7/1/2019 10:00 AM     Author: Ira Lema PT Service: -- Author Type: Physical Therapist    Filed: 8/29/2019 10:03 PM Encounter Date: 7/1/2019 Status: Addendum    : Ira Lema PT (Physical Therapist)    Related Notes: Original Note by Ira Lema PT (Physical Therapist) filed at 7/1/2019  1:30 PM       [facility] Discharge Summary  Patient Name: Joe Lima  Date: 8/29/2019         Visit Diagnosis:   1. Left shoulder pain     2. Decreased ROM of left shoulder         Goals:  NOT MET  Pt. will demonstrate/verbalize independence in self-management of condition in : 12 weeks  Pt. will be independent with home exercise program in : 12 weeks  Pt. will report decreased intensity, frequency of : Pain;in 12 weeks  Pt. will have improved quality of sleep: waking less times/night;in 12 weeks  Patient will reach / maintain arm movement: overhead;behind;for home chores;for dressing;with less pain;with less difficulty;in 12 weeks        Patient was seen for 1 visits on 7/1/2019 with 2 missed appointments.  The patient attended therapy initially, but did not finish the therapy sessions prescribed.  Goals were not fully achieved. Explanation for goals not achieved: Pt did not return after the initial evaluation.  Two attempts to contact the patient were made, however the patient has not responded to our attempts.  The patient discontinued therapy, did not return.    Therapy will be discontinued at this time.  The patient will need a new referral to resume.    Thank you for your referral.  Ira Lema  8/29/2019      [facility]   Shoulder Initial Evaluation    Patient Name: Joe Lima  Date of evaluation: 7/1/2019  Referral Diagnosis: Chronic left shoulder pain  Referring provider: Marge Brewster MD     Visit Diagnosis:     ICD-10-CM    1. Left shoulder pain M25.512    2. Decreased ROM of left shoulder M25.612         Assessment:    This 49 year old male presents with left shoulder pain since March, 2019 with insidious onset and worsening symptoms.  Pt has significant splinting with ROM but his strength at baseline is good and does not provoke symptoms.  His symptoms are consistent with idiopathic adhesive capsulitis.  He may benefit from an interarticular injection in conjunction with PT to speed recovery.  Impairments in  pain, ROM, joint mobility  Pt. is appropriate for skilled PT intervention as outlined in the Plan of Care (POC).    Goals:  Pt. will demonstrate/verbalize independence in self-management of condition in : 12 weeks  Pt. will be independent with home exercise program in : 12 weeks  Pt. will report decreased intensity, frequency of : Pain;in 12 weeks  Pt. will have improved quality of sleep: waking less times/night;in 12 weeks  Patient will reach / maintain arm movement: overhead;behind;for home chores;for dressing;with less pain;with less difficulty;in 12 weeks        Patient's expectations/goals are realistic.    Barriers to Learning or Achieving Goals:  No Barriers.       Plan / Patient Instructions:        Plan of Care:   Authorization / Certification Start Date: 07/01/19  Authorization / Certification End Date: 09/29/19  Authorization / Certification Number of Visits: 12  Communication with: Referral Source  Patient Related Instruction: Nature of Condition;Treatment plan and rationale;Self Care instruction;Basis of treatment;Body mechanics;Posture;Precautions;Next steps;Expected outcome  Times per Week: 2-3x/month  Number of Visits: 12  Discharge Planning: to home program  Precautions / Restrictions : smoker, obesity, DMII  Therapeutic Exercise: ROM;Stretching;Strengthening  Neuromuscular Reeducation: posture;kinesio tape;TNE  Manual Therapy: soft tissue mobilization;myofascial release;joint mobilization;muscle energy;strain counterstrain  Modalities: iontophoresis;ultrasound  Functional Training  (ADL's): self care  Equipment: theraband;TENS unit      POC and pathology of condition were reviewed with patient.  Pt. is in agreement with the Plan of Care  A Home Exercise Program (HEP) was initiated today.  Pt. was instructed in exercises by PT and patient was given a handout with detailed instructions.     Plan for next visit:   Check on status of injection  Check ROM  Consider MT/KT for pain  Advance pain free exercises for ROM and strength  .   Subjective:       Social information:       Occupation:Dispatcher for transportation at     Equipment Available: None    History of Present Illness:    Joe is a 49 y.o. male who presents to therapy today with complaints of left shoulder pain. Date of onset/duration of symptoms is March, 2019 when he would put on a jacket.. Onset was gradual. Symptoms are constant and getting worse. He denies history of similar symptoms. He describes their previous level of function as not limited    Pain Rating:3  Pain rating at best: 1  Pain rating at worst: 6; one time it got to 9-10 with a sudden jerk  Pain description: aching and prickily    Functional limitations are described as occurring with:   personal cares donning shirt or jacket  reaching overhead, behind back and washing hair or reaching behind.  sleeping    Patient reports benefit from:  pain medication, cold, changing positions       Objective:      Note: Items left blank indicates the item was not performed or not indicated at the time of the evaluation.    Patient Outcome Measures :    Shoulder Pain and Disability Index (SPADI) in %: 45     Scores range from 0-100%, where a score of 0% represents minimal pain and maximal function. The minimal clincically important difference is a score reduction of 10%.    Involved side: Left  Posture Observation:      General sitting posture is  fair.  General standing posture is fair.  Cervical:  Mild forward head  Shoulder/Thoracic complex: Mild bilateral scapular protraction  "  Cervical Clearing: Normal    Shoulder/Elbow ROM     Date: 7/1/2019     Shoulder and Elbow ROM ( )   AROM in degrees AROM in degrees AROM in degrees    Right Left Right Left Right Left   Shoulder Flexion (0-180 ) 160 100       Shoulder Abduction (0-180 ) 170 65       Shoulder Extension (0-60 ) 60 40       Shoulder ER (0-90 ) 60 25       Shoulder IR (0-70 )         Shoulder IR/Ext T8 Mid buttock       Elbow Flexion (150 ) WNL WNL       Elbow Extension (0 ) WNL WNL        PROM in degrees PROM in degrees PROM in degrees    Right Left Right Left Right Left   Shoulder Flexion (0-180 )  Too guarded to test       Shoulder Abduction (0-180 )  \"       Shoulder Extension (0-60 )  \"       Shoulder ER (0-90 )  \"       Shoulder IR (0-70 )  \"       Elbow Flexion (150 )  WNL       Elbow Extension (0 )  WNL         Shoulder/Elbow Strength WNL at baseline       Joint Assessment:  Sternoclavicular Joint Assessment: WNL  Acromioclavicular Joint Assessment: WNL  Scapulothoracic Joint Assessment: WNL.  Glenohumeral Joint Assessment:guarding/splinting ( unable to assess)    Shoulder Special Tests   Unable to assess      Treatment Today     TREATMENT MINUTES COMMENTS   Evaluation 25 low   Self-care/ Home management     Manual therapy 5 AAROM; STM to upper trap and shoulder   Neuromuscular Re-education     Therapeutic Activity     Therapeutic Exercises 25 Discussed nature of condition, basis of treatment, POC, precautions.  Exercises:  Exercise #1:  Codman's pendulum exercises  Comment #1: HEP  Exercise #2: Wand: flesion, abduction, ER  Comment #2: HEP  Exercise #3: Table top flexion, abduction; wall flexion  Comment #3: HEP  Exercise #4: Isometrics, FL,EX,IR,ER,ADB,ADD  Comment #4: HEP         Gait training     Modality__________________                Total 60    Blank areas are intentional and mean the treatment did not include these items.     PT Evaluation Code: (Please list factors)  Patient History/Comorbidities: smoker, obesity, " DMII  Examination: 3  Clinical Presentation: worsening  Clinical Decision Making: low    Patient History/  Comorbidities Examination  (body structures and functions, activity limitations, and/or participation restrictions) Clinical Presentation Clinical Decision Making (Complexity)   No documented Comorbidities or personal factors 1-2 Elements Stable and/or uncomplicated Low   1-2 documented comorbidities or personal factor 3 Elements Evolving clinical presentation with changing characteristics Moderate   3-4 documented comorbidities or personal factors 4 or more Unstable and unpredictable High              Ira Lema  7/1/2019  10:06 AM

## 2021-07-03 NOTE — ADDENDUM NOTE
Addendum Note by Marge Brewster MD at 2/19/2019 11:00 AM     Author: Marge Brewster MD Service: -- Author Type: Physician    Filed: 2/20/2019 10:52 AM Encounter Date: 2/19/2019 Status: Signed    : Marge Brewster MD (Physician)    Addended by: MARGE BREWSTER on: 2/20/2019 10:52 AM        Modules accepted: Orders

## 2021-07-24 ENCOUNTER — HEALTH MAINTENANCE LETTER (OUTPATIENT)
Age: 51
End: 2021-07-24

## 2021-09-16 ENCOUNTER — OFFICE VISIT (OUTPATIENT)
Dept: FAMILY MEDICINE | Facility: CLINIC | Age: 51
End: 2021-09-16
Payer: COMMERCIAL

## 2021-09-16 VITALS
WEIGHT: 242 LBS | HEART RATE: 83 BPM | SYSTOLIC BLOOD PRESSURE: 124 MMHG | HEIGHT: 72 IN | DIASTOLIC BLOOD PRESSURE: 80 MMHG | BODY MASS INDEX: 32.78 KG/M2

## 2021-09-16 DIAGNOSIS — B35.3 TINEA PEDIS OF RIGHT FOOT: ICD-10-CM

## 2021-09-16 DIAGNOSIS — Z91.148 NONADHERENCE TO MEDICATION: ICD-10-CM

## 2021-09-16 DIAGNOSIS — E66.811 OBESITY (BMI 30.0-34.9): ICD-10-CM

## 2021-09-16 DIAGNOSIS — E11.65 TYPE 2 DIABETES MELLITUS WITH HYPERGLYCEMIA, WITHOUT LONG-TERM CURRENT USE OF INSULIN (H): Primary | ICD-10-CM

## 2021-09-16 DIAGNOSIS — N48.89 PENILE CURVATURE, ACQUIRED: ICD-10-CM

## 2021-09-16 LAB
CHOLEST SERPL-MCNC: 183 MG/DL
FASTING STATUS PATIENT QL REPORTED: NO
HBA1C MFR BLD: 7.3 % (ref 0–5.6)
HDLC SERPL-MCNC: 41 MG/DL
LDLC SERPL CALC-MCNC: 114 MG/DL
TRIGL SERPL-MCNC: 138 MG/DL

## 2021-09-16 PROCEDURE — 99214 OFFICE O/P EST MOD 30 MIN: CPT | Performed by: FAMILY MEDICINE

## 2021-09-16 PROCEDURE — 36415 COLL VENOUS BLD VENIPUNCTURE: CPT | Performed by: FAMILY MEDICINE

## 2021-09-16 PROCEDURE — 83036 HEMOGLOBIN GLYCOSYLATED A1C: CPT | Performed by: FAMILY MEDICINE

## 2021-09-16 PROCEDURE — 80061 LIPID PANEL: CPT | Performed by: FAMILY MEDICINE

## 2021-09-16 ASSESSMENT — MIFFLIN-ST. JEOR: SCORE: 1985.7

## 2021-09-16 NOTE — PROGRESS NOTES
Assessment & Plan     Type 2 diabetes mellitus with hyperglycemia, without long-term current use of insulin (H)  A1c is just above goal range.  Asked patient to resume taking his metformin along with lifestyle changes including more cardiovascular exercise.  See below for details.  Plan recheck labs in 3 months along with a complete physical.  - Hemoglobin A1c; Future  - Lipid panel reflex to direct LDL Fasting; Future    Nonadherence to medication  Recommended restarting metformin and statin.    Obesity (BMI 30.0-34.9)  Discussed a low-carb, low sugar and vegetable-rich diet along with regular cardiovascular exercise.  Suggested gametizing fitness using a fitness tracking device, which patient seems interested in.  - Lipid panel reflex to direct LDL Fasting; Future    Tinea pedis of right foot  Suggested topical clotrimazole in the form of powder or Lotrimin cream.  Patient has powder at home.  Ultimately discussed keeping the areas between the toes as clean and dry as possible.    Penile curvature, acquired  Patient did not follow up with Urology.  He feels that his penis remains curved but does not wish to have another referral placed for now.  This was not re-examined today.               BMI:   Estimated body mass index is 32.82 kg/m  as calculated from the following:    Height as of this encounter: 1.829 m (6').    Weight as of this encounter: 109.8 kg (242 lb).   Weight management plan: Discussed healthy diet and exercise guidelines        Return in about 3 months (around 12/16/2021) for Routine preventive.    Marge Brewster MD  [facility]    Sherri Diaz is a 51 year old who presents for the following health issues     HPI     Patient presents today in follow-up of type II diabetes.  At his last visit on 12/2020, we discussed restarting his medications because he was not taking them at that time.  He admits that he took his medications very regularly until 4-6 weeks  ago, when he again stopped his metformin and statin.  He is up 2 pounds from his last visit.  He says that he has made some lifestyle changes to include no longer drinking regular soda.  He does drink a sugar-free energy drink about every other day.  He does snore quite a bit, and usually doesn't wake feeling rested.  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  He will think about whether he would like to complete a sleep study.    Labs Reviewed:  Lab Results   Component Value Date    CHOL 212 (H) 12/09/2020    TRIG 156 (H) 12/09/2020    HDL 38 (L) 12/09/2020    ALT 43 12/09/2020    AST 25 12/09/2020     12/09/2020    BUN 16 12/09/2020    CO2 26 12/09/2020    MICROALBUR 1.12 12/09/2020   A1c 7.1 12/9/2020      Review of Systems   Constitutional, HEENT, cardiovascular, pulmonary, gi and gu systems are negative, except as otherwise noted.      Objective    /80 (BP Location: Left arm, Patient Position: Sitting, Cuff Size: Adult Regular)   Pulse 83   Ht 1.829 m (6')   Wt 109.8 kg (242 lb)   BMI 32.82 kg/m    Body mass index is 32.82 kg/m .  Physical Exam   GENERAL: healthy, alert and no distress  RESP: lungs clear to auscultation - no rales, rhonchi or wheezes  CV: regular rate and rhythm, normal S1 S2, no S3 or S4, no murmur, click or rub, no peripheral edema and peripheral pulses strong  MS: no gross musculoskeletal defects noted, no edema  NEURO: mentation intact and monofilament exam normal bilaterally  PSYCH: mentation appears normal, affect normal/bright    Results for orders placed or performed in visit on 09/16/21   Hemoglobin A1c     Status: Abnormal   Result Value Ref Range    Hemoglobin A1C 7.3 (H) 0.0 - 5.6 %   Lipid panel reflex to direct LDL Fasting     Status: None   Result Value Ref Range    Cholesterol 183 <=199 mg/dL    Triglycerides 138 <=149 mg/dL    Direct Measure HDL 41 >=40 mg/dL    LDL Cholesterol Calculated 114 <=129 mg/dL    Patient Fasting > 8hrs? No

## 2021-09-18 ENCOUNTER — HEALTH MAINTENANCE LETTER (OUTPATIENT)
Age: 51
End: 2021-09-18

## 2021-09-28 NOTE — PATIENT INSTRUCTIONS - HE
Routine OB Visit    S: 23 year old  at 33w6d here today for routine OB prenatal visit. Denies LOF/UC/VB.  Reports FM.     Denies HA/changes in vision/RUQ pain.     O:   Visit Vitals  /70   Pulse (!) 109   Ht 5' 2\" (1.575 m)   Wt 92.4 kg (203 lb 11.2 oz)   LMP 2021 (Exact Date)   SpO2 98%   BMI 37.26 kg/m²     Fetal Non-Stress Test    Date/Time: 2021 12:20 PM  Performed by: Regi Hernandez MD  Authorized by: Regi Hernandez MD     Number of Fetuses:  1  Contractions:  No contractions  Interpretation - Baby A:     Nonstress Test Interpretation: reactive    Nonstress Test - Baby A:     Variability: moderate      Decelerations: no decelerations      Accelerations: at least 15 BPM for 15 seconds      Baseline:  140            A/P: 23 year old  at 33w6d for routine PNC visit.    1. Routine PNC   -Cont PNV.    -FMC and LRP reviewed.    -Preeclampsia precautions reviewed.    - 3rd TM labs reveal GDM and persistent anemia - to follow with Heme Onc - Venofer to be scheduled  - influenza and Tdap vaccinations UTD    2. Sickle cell trait  - urine culture negative  - repeat reassuring in 3rd TM    3. Missed appointment  - UTOX negative    4. History of PPD  - close f/u  - declines meds    5. History of GDM and GDMA2 this pregnancy with glucola of 207  - Currently on Metformin 500 mg at bedtime  - Managed by GDM service with MFM  - NSTs at 32 weeks per MFM - reactive today    -RTC in 1 week - NSTs    Regi Hernandez MD     Testing testingI'll let you know when I want to see you back based on what your A1c shows today.    Try a couple of sessions of PT and some diligent home exercise and let's see how your shoulder pain is in another 4-6 weeks.  If no improvement, we will consider MRI.

## 2021-12-14 ENCOUNTER — OFFICE VISIT (OUTPATIENT)
Dept: FAMILY MEDICINE | Facility: CLINIC | Age: 51
End: 2021-12-14
Payer: COMMERCIAL

## 2021-12-14 VITALS
BODY MASS INDEX: 32.69 KG/M2 | SYSTOLIC BLOOD PRESSURE: 114 MMHG | HEIGHT: 72 IN | WEIGHT: 241.38 LBS | DIASTOLIC BLOOD PRESSURE: 79 MMHG | HEART RATE: 87 BPM

## 2021-12-14 DIAGNOSIS — Z78.9 HEPATITIS VACCINATION STATUS UNKNOWN: ICD-10-CM

## 2021-12-14 DIAGNOSIS — L72.3 SEBACEOUS CYST: ICD-10-CM

## 2021-12-14 DIAGNOSIS — E11.9 TYPE 2 DIABETES MELLITUS WITHOUT COMPLICATION, WITHOUT LONG-TERM CURRENT USE OF INSULIN (H): Primary | ICD-10-CM

## 2021-12-14 DIAGNOSIS — M75.51 BURSITIS OF RIGHT SHOULDER: ICD-10-CM

## 2021-12-14 DIAGNOSIS — E66.811 OBESITY (BMI 30.0-34.9): ICD-10-CM

## 2021-12-14 LAB
ALBUMIN SERPL-MCNC: 4.1 G/DL (ref 3.5–5)
ALP SERPL-CCNC: 93 U/L (ref 45–120)
ALT SERPL W P-5'-P-CCNC: 38 U/L (ref 0–45)
ANION GAP SERPL CALCULATED.3IONS-SCNC: 11 MMOL/L (ref 5–18)
AST SERPL W P-5'-P-CCNC: 17 U/L (ref 0–40)
BILIRUB SERPL-MCNC: 0.4 MG/DL (ref 0–1)
BUN SERPL-MCNC: 17 MG/DL (ref 8–22)
CALCIUM SERPL-MCNC: 9.5 MG/DL (ref 8.5–10.5)
CHLORIDE BLD-SCNC: 105 MMOL/L (ref 98–107)
CO2 SERPL-SCNC: 23 MMOL/L (ref 22–31)
CREAT SERPL-MCNC: 0.92 MG/DL (ref 0.7–1.3)
CREAT UR-MCNC: 133 MG/DL
GFR SERPL CREATININE-BSD FRML MDRD: >90 ML/MIN/1.73M2
GLUCOSE BLD-MCNC: 145 MG/DL (ref 70–125)
HBA1C MFR BLD: 6.7 % (ref 0–5.6)
MICROALBUMIN UR-MCNC: 0.75 MG/DL (ref 0–1.99)
MICROALBUMIN/CREAT UR: 5.6 MG/G CR
POTASSIUM BLD-SCNC: 4.3 MMOL/L (ref 3.5–5)
PROT SERPL-MCNC: 7.4 G/DL (ref 6–8)
SODIUM SERPL-SCNC: 139 MMOL/L (ref 136–145)

## 2021-12-14 PROCEDURE — 99215 OFFICE O/P EST HI 40 MIN: CPT | Performed by: FAMILY MEDICINE

## 2021-12-14 PROCEDURE — 82043 UR ALBUMIN QUANTITATIVE: CPT | Performed by: FAMILY MEDICINE

## 2021-12-14 PROCEDURE — 80053 COMPREHEN METABOLIC PANEL: CPT | Performed by: FAMILY MEDICINE

## 2021-12-14 PROCEDURE — 36415 COLL VENOUS BLD VENIPUNCTURE: CPT | Performed by: FAMILY MEDICINE

## 2021-12-14 PROCEDURE — 83036 HEMOGLOBIN GLYCOSYLATED A1C: CPT | Performed by: FAMILY MEDICINE

## 2021-12-14 PROCEDURE — 86706 HEP B SURFACE ANTIBODY: CPT | Performed by: FAMILY MEDICINE

## 2021-12-14 RX ORDER — METFORMIN HCL 500 MG
TABLET, EXTENDED RELEASE 24 HR ORAL
Qty: 90 TABLET | Refills: 3 | Status: SHIPPED | OUTPATIENT
Start: 2021-12-14 | End: 2022-10-27

## 2021-12-14 ASSESSMENT — MIFFLIN-ST. JEOR: SCORE: 1982.87

## 2021-12-14 NOTE — PROGRESS NOTES
Assessment & Plan     Type 2 diabetes mellitus without complication, without long-term current use of insulin (H)  A1c is now within goal range.  Patient to continue lifestyle changes and add in more regular exercise.  Will continue same dose of metformin for now.  Microalbumin pending at time of note.  Plan follow-up in 6 months for CPE and recheck diabetes.  - metFORMIN (GLUCOPHAGE-XR) 500 MG 24 hr tablet; TAKE 1 TABLET BY MOUTH ONCE DAILY WITH BREAKFAST  - Albumin Random Urine Quantitative with Creat Ratio; Future  - Comprehensive metabolic panel (BMP + Alb, Alk Phos, ALT, AST, Total. Bili, TP); Future  - Hemoglobin A1c; Future    Obesity (BMI 30.0-34.9)  Discussed health benefits of regular exercise and low-carb, low sugar diet at some length today.  Discussed strategies to work exercise in to his busy schedule.    Bursitis of right shoulder  Offered a visit with Orthopedics, who may try a trial of aspiration.  Discussed that this issue can be surgical, patient will think about these options.    Sebaceous cyst  Discussed benign nature of this condition.  If he desires removal, due to placement of the lesion, would suggest removal by Dermatologist.    Hepatitis vaccination status unknown  Patient does not believe he has ever been immunized against hepatitis B.  Will check immune status with other labs today.  - Hepatitis B Surface Antibody; Future    Review of the result(s) of each unique test - labs as below  Ordering of each unique test  Prescription drug management  45 minutes spent on the date of the encounter doing chart review, history and exam, documentation and further activities per the note           Return in about 6 months (around 6/14/2022) for Routine preventive.    Marge Brewster MD  [facility]    Sherri Perera is a 51 year old who presents for the following health issues     HPI     Patient presents today in follow-up of type II diabetes.  He reports that he has  new been taking his metformin and Lipitor faithfully.  He tries to get some more exercise during the day, walking a bit at work or walking outside when not working.  He has been making some dietary changes, moving carbohydrates to earlier meals of the day.  His home scale suggests that he has lost 9 pounds, although our scale shows <1 lb weight loss from last visit 3 months ago.  Next, patient mentions that he had an infected cyst on his central chest in the past, was treated with antibiotics, but now remains with a lump in that area.  This is not tender or draining, has no surrounding erythema.  Lastly, he has a larger lump on the right shoulder.  This can be tender intermittently, but is not red or hot.  He is wondering if this could be drained easily.      Labs Reviewed:  Lab Results   Component Value Date    CHOL 183 09/16/2021    TRIG 138 09/16/2021    HDL 41 09/16/2021    ALT 43 12/09/2020    AST 25 12/09/2020     12/09/2020    BUN 16 12/09/2020    CO2 26 12/09/2020    MICROALBUR 1.12 12/09/2020       Review of Systems   Constitutional, HEENT, cardiovascular, pulmonary, gi and gu systems are negative, except as otherwise noted.      Objective    /79 (BP Location: Left arm, Patient Position: Sitting, Cuff Size: Adult Large)   Pulse 87   Ht 1.829 m (6')   Wt 109.5 kg (241 lb 6 oz)   BMI 32.74 kg/m    Body mass index is 32.74 kg/m .  Physical Exam   GENERAL: healthy, alert and no distress  EYES: Eyes grossly normal to inspection, PERRL and conjunctivae and sclerae normal  RESP: lungs clear to auscultation - no rales, rhonchi or wheezes  CV: regular rate and rhythm, normal S1 S2, no S3 or S4, no murmur, click or rub, no peripheral edema and peripheral pulses strong  ABDOMEN: soft, nontender, no hepatosplenomegaly, no masses and bowel sounds normal  MS: fluctuant superficial lump deltoid of right shoulder, no drainage, warmth or erythema  SKIN: no suspicious lesions or rashes; 1 cm superficial lump  central chest, lower sternum area  NEURO: Normal strength and tone, mentation intact and speech normal  PSYCH: mentation appears normal, affect normal/bright  Diabetic foot exam: normal DP and PT pulses, no trophic changes or ulcerative lesions, normal sensory exam and normal monofilament exam    Results for orders placed or performed in visit on 12/14/21 (from the past 24 hour(s))   Comprehensive metabolic panel (BMP + Alb, Alk Phos, ALT, AST, Total. Bili, TP)   Result Value Ref Range    Sodium 139 136 - 145 mmol/L    Potassium 4.3 3.5 - 5.0 mmol/L    Chloride 105 98 - 107 mmol/L    Carbon Dioxide (CO2) 23 22 - 31 mmol/L    Anion Gap 11 5 - 18 mmol/L    Urea Nitrogen 17 8 - 22 mg/dL    Creatinine 0.92 0.70 - 1.30 mg/dL    Calcium 9.5 8.5 - 10.5 mg/dL    Glucose 145 (H) 70 - 125 mg/dL    Alkaline Phosphatase 93 45 - 120 U/L    AST 17 0 - 40 U/L    ALT 38 0 - 45 U/L    Protein Total 7.4 6.0 - 8.0 g/dL    Albumin 4.1 3.5 - 5.0 g/dL    Bilirubin Total 0.4 0.0 - 1.0 mg/dL    GFR Estimate >90 >60 mL/min/1.73m2   Hemoglobin A1c   Result Value Ref Range    Hemoglobin A1C 6.7 (H) 0.0 - 5.6 %   Albumin Random Urine Quantitative with Creat Ratio   Result Value Ref Range    Microalbumin Urine mg/dL 0.75 0.00 - 1.99 mg/dL    Creatinine Urine mg/dL 133 mg/dL    Microalbumin Urine mg/g Cr 5.6 <=19.9 mg/g Cr    Narrative    Microalbumin, Random Urine   <2.0 mg/dL . . . . . . . . Normal   3.0-30.0 mg/dL . . . . . . Microalbuminuria   >30.0 mg/dL . . . . . .  . Clinical Proteinuria     Microalbumin/Creatinine Ratio, Random Urine   <20 mg/g . . . . .. . . . Normal    mg/g . . . . . . . Microalbuminuria   >300 mg/g . . . . . . . . Clinical Proteinuria

## 2021-12-15 LAB — HBV SURFACE AB SERPLBLD QL IA.RAPID: NEGATIVE

## 2022-06-25 ENCOUNTER — HEALTH MAINTENANCE LETTER (OUTPATIENT)
Age: 52
End: 2022-06-25

## 2022-08-20 ENCOUNTER — HEALTH MAINTENANCE LETTER (OUTPATIENT)
Age: 52
End: 2022-08-20

## 2022-10-27 ENCOUNTER — OFFICE VISIT (OUTPATIENT)
Dept: FAMILY MEDICINE | Facility: CLINIC | Age: 52
End: 2022-10-27
Payer: COMMERCIAL

## 2022-10-27 ENCOUNTER — TELEPHONE (OUTPATIENT)
Dept: FAMILY MEDICINE | Facility: CLINIC | Age: 52
End: 2022-10-27

## 2022-10-27 VITALS
BODY MASS INDEX: 32.55 KG/M2 | SYSTOLIC BLOOD PRESSURE: 118 MMHG | WEIGHT: 240 LBS | RESPIRATION RATE: 20 BRPM | DIASTOLIC BLOOD PRESSURE: 80 MMHG | HEART RATE: 78 BPM

## 2022-10-27 DIAGNOSIS — E11.9 TYPE 2 DIABETES MELLITUS WITHOUT COMPLICATION, WITHOUT LONG-TERM CURRENT USE OF INSULIN (H): Primary | ICD-10-CM

## 2022-10-27 DIAGNOSIS — E66.811 OBESITY (BMI 30.0-34.9): ICD-10-CM

## 2022-10-27 DIAGNOSIS — E78.5 HYPERLIPIDEMIA LDL GOAL <70: ICD-10-CM

## 2022-10-27 DIAGNOSIS — Z12.11 COLON CANCER SCREENING: ICD-10-CM

## 2022-10-27 LAB — HBA1C MFR BLD: 7.4 % (ref 0–5.6)

## 2022-10-27 PROCEDURE — 90471 IMMUNIZATION ADMIN: CPT | Performed by: FAMILY MEDICINE

## 2022-10-27 PROCEDURE — 83036 HEMOGLOBIN GLYCOSYLATED A1C: CPT | Performed by: FAMILY MEDICINE

## 2022-10-27 PROCEDURE — 91312 COVID-19,PF,PFIZER BOOSTER BIVALENT: CPT | Performed by: FAMILY MEDICINE

## 2022-10-27 PROCEDURE — 90677 PCV20 VACCINE IM: CPT | Performed by: FAMILY MEDICINE

## 2022-10-27 PROCEDURE — 99214 OFFICE O/P EST MOD 30 MIN: CPT | Mod: 25 | Performed by: FAMILY MEDICINE

## 2022-10-27 PROCEDURE — 36415 COLL VENOUS BLD VENIPUNCTURE: CPT | Performed by: FAMILY MEDICINE

## 2022-10-27 PROCEDURE — 0124A COVID-19,PF,PFIZER BOOSTER BIVALENT: CPT | Performed by: FAMILY MEDICINE

## 2022-10-27 RX ORDER — FLASH GLUCOSE SCANNING READER
EACH MISCELLANEOUS
Qty: 1 EACH | Refills: 0 | Status: SHIPPED | OUTPATIENT
Start: 2022-10-27 | End: 2024-05-23

## 2022-10-27 RX ORDER — ATORVASTATIN CALCIUM 20 MG/1
20 TABLET, FILM COATED ORAL DAILY
Qty: 90 TABLET | Refills: 3 | Status: SHIPPED | OUTPATIENT
Start: 2022-10-27 | End: 2024-05-24

## 2022-10-27 RX ORDER — FLASH GLUCOSE SENSOR
KIT MISCELLANEOUS
Qty: 2 EACH | Refills: 11 | Status: SHIPPED | OUTPATIENT
Start: 2022-10-27 | End: 2024-05-23

## 2022-10-27 RX ORDER — METFORMIN HCL 500 MG
500 TABLET, EXTENDED RELEASE 24 HR ORAL 2 TIMES DAILY WITH MEALS
Qty: 180 TABLET | Refills: 3 | Status: SHIPPED | OUTPATIENT
Start: 2022-10-27 | End: 2022-10-27

## 2022-10-27 RX ORDER — METFORMIN HCL 500 MG
500 TABLET, EXTENDED RELEASE 24 HR ORAL 2 TIMES DAILY WITH MEALS
Qty: 180 TABLET | Refills: 3 | Status: SHIPPED | OUTPATIENT
Start: 2022-10-27 | End: 2022-10-28

## 2022-10-27 NOTE — TELEPHONE ENCOUNTER
Incoming call from [facility]. They need clarification on the metformin as the sig has two sets of directions.    Please resend    Thanks!    Ugo Bronson RN     [facility]

## 2022-10-27 NOTE — PROGRESS NOTES
Assessment & Plan     Type 2 diabetes mellitus without complication, without long-term current use of insulin (H)  A1c remains higher than goal.  Patient would like to try a Freestyle Eitan sensor system to check his blood sugars as he feels that this will be motivating for him.  Also recommended referral to diabetic education to discuss diet recommendations.  He is due for an eye exam and referral is placed.  Recommended increasing metformin to 1500 mg daily with recheck in 3 months.  - Continuous Blood Gluc  (FREESTYLE EITAN 14 DAY READER) VILMA; Use to read blood sugars as per 's instructions.  - Continuous Blood Gluc Sensor (FREESTYLE EITAN 14 DAY SENSOR) MISC; Change every 14 days.  - Adult Eye  Referral; Future  - [facility] Adult Diabetes Educator Referral; Future  - Hemoglobin A1c  - metFORMIN (GLUCOPHAGE XR) 500 MG 24 hr tablet; Take 3 tablets (1,500 mg) by mouth daily (with dinner)    Obesity (BMI 30.0-34.9)  Discussed a vegetable-rich diet and regular cardiovascular exercise.  Congratulated patient on his efforts in this regard.    Colon cancer screening  Discussed the importance of colon cancer screening and order placed for Cologuard.  - COLOGUARD(EXACT SCIENCES)    Hyperlipidemia LDL goal <70  Patient stopped his Lipitor for unclear reasons.  Recommended restarting and will check lipids at next visit.  - atorvastatin (LIPITOR) 20 MG tablet; Take 1 tablet (20 mg) by mouth daily             BMI:   Estimated body mass index is 32.55 kg/m  as calculated from the following:    Height as of 12/14/21: 1.829 m (6').    Weight as of this encounter: 108.9 kg (240 lb).   Weight management plan: Discussed healthy diet and exercise guidelines        Return in about 3 months (around 1/27/2023) for Routine preventive.    Marge Brewster MD  [facility] ANDREW Perera is a 52 year old, presenting for the following health issues:  Diabetes (Hgb A1c 6.7 on  "12/14/21)      History of Present Illness       Diabetes:   He presents for follow up of diabetes.  He is not checking blood glucose. He has no concerns regarding his diabetes at this time.  He is not experiencing numbness or burning in feet, excessive thirst, blurry vision, weight changes or redness, sores or blisters on feet. The patient has not had a diabetic eye exam in the last 12 months.         He eats 2-3 servings of fruits and vegetables daily.He consumes 0 sweetened beverage(s) daily.He exercises with enough effort to increase his heart rate 10 to 19 minutes per day.  He exercises with enough effort to increase his heart rate 4 days per week. He is missing 3 dose(s) of medications per week.     Patient presents today for follow-up of type II diabetes.  He got a smart watch since his last visit and has been motivated to close his move and exercise rings.  He has been trying to improve his diet as well.  He has never seen a diabetic educator.  He asks about an \"arm sensor\" because he feels that checking his blood sugars might be motivating for him.  We discussed how and when to check and to bring readings to future visits.  He is due for colon cancer screening and an annual eye exam.  He has not been taking his Lipitor because he says he ran out of refills and so assumed he should stop this medication.  We discussed this in some detail today also.  He has no particular questions or concerns otherwise.  COVID and pneumonia vaccines are updated today.      Review of Systems   Constitutional, HEENT, cardiovascular, pulmonary, gi and gu systems are negative, except as otherwise noted.      Objective    /80 (BP Location: Left arm, Patient Position: Sitting, Cuff Size: Adult Regular)   Pulse 78   Resp 20   Wt 108.9 kg (240 lb)   BMI 32.55 kg/m    Body mass index is 32.55 kg/m .  Physical Exam   GENERAL: healthy, alert and no distress  RESP: lungs clear to auscultation - no rales, rhonchi or wheezes  CV: " regular rate and rhythm, normal S1 S2, no S3 or S4, no murmur, click or rub, no peripheral edema and peripheral pulses strong  MS: no gross musculoskeletal defects noted, no edema  SKIN: no suspicious lesions or rashes  NEURO: Normal strength and tone, mentation intact and speech normal  PSYCH: mentation appears normal, affect normal/bright    Results for orders placed or performed in visit on 10/27/22   Hemoglobin A1c     Status: Abnormal   Result Value Ref Range    Hemoglobin A1C 7.4 (H) 0.0 - 5.6 %

## 2022-10-28 RX ORDER — METFORMIN HCL 500 MG
1500 TABLET, EXTENDED RELEASE 24 HR ORAL
Qty: 270 TABLET | Refills: 3 | Status: SHIPPED | OUTPATIENT
Start: 2022-10-28 | End: 2023-11-29

## 2022-12-07 LAB — NONINV COLON CA DNA+OCC BLD SCRN STL QL: NEGATIVE

## 2022-12-08 ENCOUNTER — TRANSFERRED RECORDS (OUTPATIENT)
Dept: HEALTH INFORMATION MANAGEMENT | Facility: CLINIC | Age: 52
End: 2022-12-08

## 2022-12-08 LAB — RETINOPATHY: NEGATIVE

## 2023-06-01 ENCOUNTER — HEALTH MAINTENANCE LETTER (OUTPATIENT)
Age: 53
End: 2023-06-01

## 2023-09-10 ENCOUNTER — HEALTH MAINTENANCE LETTER (OUTPATIENT)
Age: 53
End: 2023-09-10

## 2023-11-29 DIAGNOSIS — E11.9 TYPE 2 DIABETES MELLITUS WITHOUT COMPLICATION, WITHOUT LONG-TERM CURRENT USE OF INSULIN (H): ICD-10-CM

## 2023-11-29 RX ORDER — METFORMIN HCL 500 MG
1500 TABLET, EXTENDED RELEASE 24 HR ORAL
Qty: 270 TABLET | Refills: 0 | Status: SHIPPED | OUTPATIENT
Start: 2023-11-29 | End: 2024-02-21

## 2024-01-28 ENCOUNTER — HEALTH MAINTENANCE LETTER (OUTPATIENT)
Age: 54
End: 2024-01-28

## 2024-02-21 DIAGNOSIS — E11.9 TYPE 2 DIABETES MELLITUS WITHOUT COMPLICATION, WITHOUT LONG-TERM CURRENT USE OF INSULIN (H): ICD-10-CM

## 2024-02-21 RX ORDER — METFORMIN HCL 500 MG
TABLET, EXTENDED RELEASE 24 HR ORAL
Qty: 270 TABLET | Refills: 0 | Status: SHIPPED | OUTPATIENT
Start: 2024-02-21 | End: 2024-05-23

## 2024-05-23 ENCOUNTER — TELEPHONE (OUTPATIENT)
Dept: FAMILY MEDICINE | Facility: CLINIC | Age: 54
End: 2024-05-23

## 2024-05-23 ENCOUNTER — OFFICE VISIT (OUTPATIENT)
Dept: FAMILY MEDICINE | Facility: CLINIC | Age: 54
End: 2024-05-23
Payer: COMMERCIAL

## 2024-05-23 VITALS
BODY MASS INDEX: 31.49 KG/M2 | WEIGHT: 237.6 LBS | SYSTOLIC BLOOD PRESSURE: 112 MMHG | RESPIRATION RATE: 16 BRPM | HEART RATE: 86 BPM | TEMPERATURE: 97.1 F | HEIGHT: 73 IN | DIASTOLIC BLOOD PRESSURE: 86 MMHG | OXYGEN SATURATION: 98 %

## 2024-05-23 DIAGNOSIS — Z11.4 SCREENING FOR HIV (HUMAN IMMUNODEFICIENCY VIRUS): ICD-10-CM

## 2024-05-23 DIAGNOSIS — E78.5 HYPERLIPIDEMIA LDL GOAL <70: ICD-10-CM

## 2024-05-23 DIAGNOSIS — E78.5 HYPERLIPIDEMIA LDL GOAL <100: ICD-10-CM

## 2024-05-23 DIAGNOSIS — Z87.898 HISTORY OF SNORING: ICD-10-CM

## 2024-05-23 DIAGNOSIS — M75.51 CHRONIC SHOULDER BURSITIS, RIGHT: ICD-10-CM

## 2024-05-23 DIAGNOSIS — Z11.59 NEED FOR HEPATITIS C SCREENING TEST: ICD-10-CM

## 2024-05-23 DIAGNOSIS — G47.19 EXCESSIVE DAYTIME SLEEPINESS: ICD-10-CM

## 2024-05-23 DIAGNOSIS — E11.9 TYPE 2 DIABETES MELLITUS WITHOUT COMPLICATION, WITHOUT LONG-TERM CURRENT USE OF INSULIN (H): ICD-10-CM

## 2024-05-23 DIAGNOSIS — Z12.5 SCREENING FOR MALIGNANT NEOPLASM OF PROSTATE: ICD-10-CM

## 2024-05-23 DIAGNOSIS — L40.3 PUSTULAR PSORIASIS OF PALM OF HAND: ICD-10-CM

## 2024-05-23 DIAGNOSIS — Z00.00 ROUTINE PHYSICAL EXAMINATION: Primary | ICD-10-CM

## 2024-05-23 LAB
ANION GAP SERPL CALCULATED.3IONS-SCNC: 17 MMOL/L (ref 7–15)
BUN SERPL-MCNC: 19.7 MG/DL (ref 6–20)
CALCIUM SERPL-MCNC: 9.6 MG/DL (ref 8.6–10)
CHLORIDE SERPL-SCNC: 101 MMOL/L (ref 98–107)
CHOLEST SERPL-MCNC: 191 MG/DL
CREAT SERPL-MCNC: 0.97 MG/DL (ref 0.67–1.17)
CREAT UR-MCNC: 241.6 MG/DL
DEPRECATED HCO3 PLAS-SCNC: 20 MMOL/L (ref 22–29)
EGFRCR SERPLBLD CKD-EPI 2021: >90 ML/MIN/1.73M2
ERYTHROCYTE [DISTWIDTH] IN BLOOD BY AUTOMATED COUNT: 13 % (ref 10–15)
FASTING STATUS PATIENT QL REPORTED: YES
FASTING STATUS PATIENT QL REPORTED: YES
GLUCOSE SERPL-MCNC: 171 MG/DL (ref 70–99)
HBA1C MFR BLD: 7.6 % (ref 0–5.6)
HCT VFR BLD AUTO: 47.9 % (ref 40–53)
HDLC SERPL-MCNC: 38 MG/DL
HGB BLD-MCNC: 16.5 G/DL (ref 13.3–17.7)
LDLC SERPL CALC-MCNC: 130 MG/DL
MCH RBC QN AUTO: 30.2 PG (ref 26.5–33)
MCHC RBC AUTO-ENTMCNC: 34.4 G/DL (ref 31.5–36.5)
MCV RBC AUTO: 88 FL (ref 78–100)
MICROALBUMIN UR-MCNC: 13 MG/L
MICROALBUMIN/CREAT UR: 5.38 MG/G CR (ref 0–17)
NONHDLC SERPL-MCNC: 153 MG/DL
PLATELET # BLD AUTO: 208 10E3/UL (ref 150–450)
POTASSIUM SERPL-SCNC: 4.2 MMOL/L (ref 3.4–5.3)
PSA SERPL DL<=0.01 NG/ML-MCNC: 1 NG/ML (ref 0–3.5)
RBC # BLD AUTO: 5.47 10E6/UL (ref 4.4–5.9)
SODIUM SERPL-SCNC: 138 MMOL/L (ref 135–145)
TRIGL SERPL-MCNC: 116 MG/DL
TSH SERPL DL<=0.005 MIU/L-ACNC: 1.89 UIU/ML (ref 0.3–4.2)
WBC # BLD AUTO: 7.7 10E3/UL (ref 4–11)

## 2024-05-23 PROCEDURE — 80061 LIPID PANEL: CPT | Performed by: NURSE PRACTITIONER

## 2024-05-23 PROCEDURE — 82043 UR ALBUMIN QUANTITATIVE: CPT | Performed by: NURSE PRACTITIONER

## 2024-05-23 PROCEDURE — 99214 OFFICE O/P EST MOD 30 MIN: CPT | Mod: 25 | Performed by: NURSE PRACTITIONER

## 2024-05-23 PROCEDURE — 83036 HEMOGLOBIN GLYCOSYLATED A1C: CPT | Performed by: NURSE PRACTITIONER

## 2024-05-23 PROCEDURE — 84443 ASSAY THYROID STIM HORMONE: CPT | Performed by: NURSE PRACTITIONER

## 2024-05-23 PROCEDURE — 99396 PREV VISIT EST AGE 40-64: CPT | Performed by: NURSE PRACTITIONER

## 2024-05-23 PROCEDURE — 82607 VITAMIN B-12: CPT | Performed by: NURSE PRACTITIONER

## 2024-05-23 PROCEDURE — 99207 E-CONSULT TO SLEEP MEDICINE (ADULT OUTPT PROVIDER TO SPECIALIST WRITTEN QUESTION & RESPONSE): CPT | Performed by: NURSE PRACTITIONER

## 2024-05-23 PROCEDURE — 85027 COMPLETE CBC AUTOMATED: CPT | Performed by: NURSE PRACTITIONER

## 2024-05-23 PROCEDURE — 82570 ASSAY OF URINE CREATININE: CPT | Performed by: NURSE PRACTITIONER

## 2024-05-23 PROCEDURE — G0103 PSA SCREENING: HCPCS | Performed by: NURSE PRACTITIONER

## 2024-05-23 PROCEDURE — 36415 COLL VENOUS BLD VENIPUNCTURE: CPT | Performed by: NURSE PRACTITIONER

## 2024-05-23 PROCEDURE — 80048 BASIC METABOLIC PNL TOTAL CA: CPT | Performed by: NURSE PRACTITIONER

## 2024-05-23 RX ORDER — CLOBETASOL PROPIONATE 0.5 MG/G
CREAM TOPICAL 2 TIMES DAILY
Qty: 60 G | Refills: 2 | Status: SHIPPED | OUTPATIENT
Start: 2024-05-23

## 2024-05-23 RX ORDER — FLASH GLUCOSE SCANNING READER
EACH MISCELLANEOUS
Qty: 1 EACH | Refills: 3 | Status: SHIPPED | OUTPATIENT
Start: 2024-05-23

## 2024-05-23 RX ORDER — METFORMIN HCL 500 MG
1500 TABLET, EXTENDED RELEASE 24 HR ORAL
Qty: 270 TABLET | Refills: 1 | Status: SHIPPED | OUTPATIENT
Start: 2024-05-23

## 2024-05-23 RX ORDER — FLASH GLUCOSE SENSOR
KIT MISCELLANEOUS
Qty: 2 EACH | Refills: 11 | Status: SHIPPED | OUTPATIENT
Start: 2024-05-23

## 2024-05-23 SDOH — HEALTH STABILITY: PHYSICAL HEALTH: ON AVERAGE, HOW MANY DAYS PER WEEK DO YOU ENGAGE IN MODERATE TO STRENUOUS EXERCISE (LIKE A BRISK WALK)?: 4 DAYS

## 2024-05-23 SDOH — HEALTH STABILITY: PHYSICAL HEALTH: ON AVERAGE, HOW MANY MINUTES DO YOU ENGAGE IN EXERCISE AT THIS LEVEL?: 60 MIN

## 2024-05-23 ASSESSMENT — SOCIAL DETERMINANTS OF HEALTH (SDOH): HOW OFTEN DO YOU GET TOGETHER WITH FRIENDS OR RELATIVES?: ONCE A WEEK

## 2024-05-23 NOTE — PROGRESS NOTES
Preventive Care Visit  [facility]  DEQUAN Rojas CNP, Family Medicine  May 23, 2024      Assessment & Plan     Routine physical examination      Type 2 diabetes mellitus without complication, without long-term current use of insulin (H)  Uncontrolled, add Ozempic, continue Metformin  - Lipid panel reflex to direct LDL Non-fasting; Future  - BASIC METABOLIC PANEL; Future  - Albumin Random Urine Quantitative with Creat Ratio; Future  - HEMOGLOBIN A1C; Future  - Lipid panel reflex to direct LDL Non-fasting  - BASIC METABOLIC PANEL  - HEMOGLOBIN A1C  - Albumin Random Urine Quantitative with Creat Ratio  - Continuous Glucose  (FREESTYLE EDILSON 14 DAY READER) VILMA; Use to read blood sugars as per 's instructions.  - Continuous Glucose Sensor (FREESTYLE EDILSON 14 DAY SENSOR) MISC; Change every 14 days.  - metFORMIN (GLUCOPHAGE XR) 500 MG 24 hr tablet; Take 3 tablets (1,500 mg) by mouth daily (with dinner)  - semaglutide (OZEMPIC) 2 MG/3ML pen; Inject 0.25 mg Subcutaneous every 7 days  - semaglutide (OZEMPIC) 2 MG/3ML pen; Inject 0.5 mg Subcutaneous every 7 days  - CBC with platelets; Future  - Vitamin B12; Future  - TSH with free T4 reflex; Future  - CBC with platelets  - TSH with free T4 reflex  - Vitamin B12    Chronic shoulder bursitis, right    - Orthopedic  Referral; Future    Pustular psoriasis of palm of hand    - clobetasol propionate (TEMOVATE) 0.05 % external cream; Apply topically 2 times daily To bilateral palms for max 14 consecutive days    Hyperlipidemia LDL goal <100  Has been off statin, will assess today    History of snoring    - Adult E-Consult to Sleep Medicine (Outpt Provider to Specialist Written Question & Response)    Excessive daytime sleepiness    - Adult E-Consult to Sleep Medicine (Outpt Provider to Specialist Written Question & Response)    Screening for HIV (human immunodeficiency virus)    - HIV Antigen Antibody Combo;  "Future    Need for hepatitis C screening test    - Hepatitis C Screen Reflex to HCV RNA Quant and Genotype; Future    Screening for malignant neoplasm of prostate    - PSA, screen; Future  - PSA, screen      Nicotine/Tobacco Cessation  He reports that he has been smoking cigarettes and other. He has a 6 pack-year smoking history. He uses smokeless tobacco.  Nicotine/Tobacco Cessation Plan  Information offered: Patient not interested at this time      BMI  Estimated body mass index is 31.35 kg/m  as calculated from the following:    Height as of this encounter: 1.854 m (6' 1\").    Weight as of this encounter: 107.8 kg (237 lb 9.6 oz).   Weight management plan: Discussed healthy diet and exercise guidelines    Counseling  Appropriate preventive services were discussed with this patient, including applicable screening as appropriate for fall prevention, nutrition, physical activity, Tobacco-use cessation, weight loss and cognition.  Checklist reviewing preventive services available has been given to the patient.  Reviewed patient's diet, addressing concerns and/or questions.   The patient was instructed to see the dentist every 6 months.   He is at risk for psychosocial distress and has been provided with information to reduce risk.       FUTURE APPOINTMENTS:       - Follow-up visit in 6 months, sooner PRN    Work on weight loss  Regular exercise    See Patient Instructions    Sherri Diaz is a 54 year old, presenting for the following:  Physical        5/23/2024     2:27 PM   Additional Questions   Roomed by Monica MCINTYRE        Health Care Directive  Patient does not have a Health Care Directive or Living Will: Discussed advance care planning with patient; however, patient declined at this time.    HPI    Excessive daytime drowsiness. History of loud snoring. His roommate has mentioned to him multiple times that is sounds like he stops breathing. He will catch himself stop breathing when he is falling " asleep.      Diabetes Follow-up    How often are you checking your blood sugar? Not at all  What concerns do you have today about your diabetes? None   Do you have any of these symptoms? (Select all that apply)  No numbness or tingling in feet.  No redness, sores or blisters on feet.  No complaints of excessive thirst.  No reports of blurry vision.  No significant changes to weight.  Have you had a diabetic eye exam in the last 12 months? No        BP Readings from Last 2 Encounters:   05/23/24 112/86   10/27/22 118/80     Hemoglobin A1C (%)   Date Value   05/23/2024 7.6 (H)   10/27/2022 7.4 (H)   06/17/2019 7.0 (H)     LDL Cholesterol Calculated (mg/dL)   Date Value   09/16/2021 114   12/09/2020 143 (H)   02/19/2019 148 (H)             Hyperlipidemia Follow-Up    Are you regularly taking any medication or supplement to lower your cholesterol?   No  Are you having muscle aches or other side effects that you think could be caused by your cholesterol lowering medication?  N/a    Rash  Onset/Duration: waxing and waning  Description  Location: bilateral palms  Character: raised  Itching: moderate  Intensity:  moderate  Progression of Symptoms:  waxing and waning  Accompanying signs and symptoms:   Fever: No  Body aches or joint pain: No  Sore throat symptoms: No  Recent cold symptoms: No  History:           Previous episodes of similar rash: 2 years ago  New exposures:  None  Recent travel: No  Exposure to similar rash: No  Precipitating or alleviating factors: n/a  Therapies tried and outcome: none    Concern - MSK  Onset: 20 years  Description: right shoulder discomfort lying down on shoulder  Intensity: moderate  Progression of Symptoms:  same  Accompanying Signs & Symptoms: large mass  Previous history of similar problem: bursitis   Precipitating factors:        Worsened by: pressure  Alleviating factors:        Improved by: n'a  Therapies tried and outcome:  none           5/23/2024   General Health   How would you  rate your overall physical health? Good   Feel stress (tense, anxious, or unable to sleep) Only a little   (!) STRESS CONCERN      5/23/2024   Nutrition   Three or more servings of calcium each day? Yes   Diet: Diabetic   How many servings of fruit and vegetables per day? (!) 2-3   How many sweetened beverages each day? 0-1         5/23/2024   Exercise   Days per week of moderate/strenous exercise 4 days   Average minutes spent exercising at this level 60 min         5/23/2024   Social Factors   Frequency of gathering with friends or relatives Once a week   Worry food won't last until get money to buy more No   Food not last or not have enough money for food? No   Do you have housing?  Yes   Are you worried about losing your housing? No   Lack of transportation? No   Unable to get utilities (heat,electricity)? No         5/23/2024   Fall Risk   Fallen 2 or more times in the past year? No   Trouble with walking or balance? No          5/23/2024   Dental   Dentist two times every year? (!) NO         5/23/2024   TB Screening   Were you born outside of the [place]? No         Today's PHQ-2 Score:       5/23/2024    12:50 PM   PHQ-2 ( 1999 [employer])   Q1: Little interest or pleasure in doing things 1   Q2: Feeling down, depressed or hopeless 1   PHQ-2 Score 2   Q1: Little interest or pleasure in doing things Several days   Q2: Feeling down, depressed or hopeless Several days   PHQ-2 Score 2           5/23/2024   Substance Use   Alcohol more than 3/day or more than 7/wk No   Do you use any other substances recreationally? No     Social History     Tobacco Use    Smoking status: Some Days     Current packs/day: 0.20     Average packs/day: 0.2 packs/day for 30.0 years (6.0 ttl pk-yrs)     Types: Cigarettes, Other    Smokeless tobacco: Current   Vaping Use    Vaping status: Never Used   Substance Use Topics    Alcohol use: Yes     Alcohol/week: 0.0 - 1.0 standard drinks of alcohol     Comment: Alcoholic Drinks/day: rare    Drug  "use: No             5/23/2024   One time HIV Screening   Previous HIV test? I don't know         5/23/2024   STI Screening   New sexual partner(s) since last STI/HIV test? No   Last PSA: No results found for: \"PSA\"  ASCVD Risk   The 10-year ASCVD risk score (Nohelia LINDA, et al., 2019) is: 16.5%    Values used to calculate the score:      Age: 54 years      Sex: Male      Is Non- : No      Diabetic: Yes      Tobacco smoker: Yes      Systolic Blood Pressure: 112 mmHg      Is BP treated: No      HDL Cholesterol: 41 mg/dL      Total Cholesterol: 183 mg/dL           Reviewed and updated as needed this visit by Provider                    Labs reviewed in EPIC  BP Readings from Last 3 Encounters:   05/23/24 112/86   10/27/22 118/80   12/14/21 114/79    Wt Readings from Last 3 Encounters:   05/23/24 107.8 kg (237 lb 9.6 oz)   10/27/22 108.9 kg (240 lb)   12/14/21 109.5 kg (241 lb 6 oz)                      Review of Systems  Constitutional, HEENT, cardiovascular, pulmonary, gi and gu systems are negative, except as otherwise noted.     Objective    Exam  /86   Pulse 86   Temp 97.1  F (36.2  C) (Tympanic)   Resp 16   Ht 1.854 m (6' 1\")   Wt 107.8 kg (237 lb 9.6 oz)   SpO2 98%   BMI 31.35 kg/m     Estimated body mass index is 31.35 kg/m  as calculated from the following:    Height as of this encounter: 1.854 m (6' 1\").    Weight as of this encounter: 107.8 kg (237 lb 9.6 oz).    Physical Exam  GENERAL: alert and no distress  EYES: Eyes grossly normal to inspection and conjunctivae and sclerae normal  HENT: normal cephalic/atraumatic, ear canals and TM's normal, oropharynx clear, and oral mucous membranes moist  NECK: no adenopathy, no asymmetry, masses, or scars  RESP: lungs clear to auscultation - no rales, rhonchi or wheezes  CV: regular rates and rhythm, normal S1 S2, no S3 or S4, no murmur, click or rub, and no peripheral edema  ABDOMEN: soft, nontender, no palpable or " pulsatile masses, and diastasis recti  MS: RUE exam shows normal strength and muscle mass, ROM of all joints is normal, and enlarged bursa vs mass  SKIN: no suspicious lesions or rashes  NEURO: Normal strength and tone, mentation intact and speech normal  PSYCH: mentation appears normal, affect normal/bright        Signed Electronically by: DEQUAN Rojas CNP

## 2024-05-23 NOTE — TELEPHONE ENCOUNTER
Freestyle clarisa 14 day sensors  [employer]  ID- [id number]  [phone]    Thank you,  Silvina Oviedo, Pharmacy Technician   [facility] [place]

## 2024-05-24 ENCOUNTER — E-CONSULT (OUTPATIENT)
Dept: SLEEP MEDICINE | Facility: CLINIC | Age: 54
End: 2024-05-24
Payer: COMMERCIAL

## 2024-05-24 DIAGNOSIS — G47.33 OSA (OBSTRUCTIVE SLEEP APNEA): Primary | ICD-10-CM

## 2024-05-24 LAB — VIT B12 SERPL-MCNC: 478 PG/ML (ref 232–1245)

## 2024-05-24 PROCEDURE — 99451 NTRPROF PH1/NTRNET/EHR 5/>: CPT | Performed by: INTERNAL MEDICINE

## 2024-05-24 RX ORDER — ATORVASTATIN CALCIUM 20 MG/1
20 TABLET, FILM COATED ORAL DAILY
Qty: 90 TABLET | Refills: 3 | Status: SHIPPED | OUTPATIENT
Start: 2024-05-24

## 2024-05-24 NOTE — PROGRESS NOTES
{ALL SMARTFIELDS MUST BE COMPLETED FOR PATIENT CARE AND BILLIN}    2024     E-Consult has been accepted.    Interprofessional consultation requested by:  Viola Rome APRN CNP      Clinical Question/Purpose: MY CLINICAL QUESTION IS: undiagnosed CHAU?    Patient assessment and information reviewed: 52-year-old male (BMI 33) with habitual snoring, observed apneas without significant comorbidities.    Recommendations: Sleep medicine services will initiate home sleep testing and  60-minute sleep health and therapy determination visit      The recommendations provided in this E-Consult are based on a review of clinical data pertinent to the clinical question presented, without a review of the patient's complete medical record or, the benefit of a comprehensive in-person or virtual patient evaluation. This consultation should not replace the clinical judgement and evaluation of the provider ordering this E-Consult. Any new clinical issues, or changes in patient status since the filing of this E-Consult will need to be taken into account when assessing these recommendations. Please contact me if you have further questions.    My total time spent reviewing clinical information and formulating assessment was 10 minutes.    {Report sent automatically to requesting provider once signed. (Optional):[id number]}  {Reference - Charge codes - [id number] (5+ minutes) or [id number] (No charge code):[id number]}  RAVI MANNING MD

## 2024-05-24 NOTE — RESULT ENCOUNTER NOTE
"Pushpa Diaz    Your LDL is above goal of < 100. Recommend to restart the statin, I sent this to our pharmacy. Other than the blood sugar all the other labs look good. Please let us know if you have any questions.     Take care,    DEQUAN Perez CNP    TEST DESCRIPTIONS   CBC (Complete Blood Count) includes hemoglobin, hematocrit, white blood cells, etc. This test can be used to detect anemia, infection, and abnormalities in blood cells.   Cholesterol is one of the blood fats (lipids) and is the building block used by the body for cell wall and hormone production. Increased levels of cholesterol have been proven to directly contribute to heart disease and strokes.   Triglycerides are one of the blood fats (lipids) and are thought to be associated with heart disease. If you have had anything to eat or drink other than water for 12 hours before the test and your level is high, you should have the test repeated after a 12 hour fast. Abnormally high results may also be associated with diabetes, kidney and liver diseases.   LDL is the low density lipoprotein component of the cholesterol. It is the harmful substance that deposits cholesterol on the artery walls contributing to heart attacks and strokes. A high LDL level is associated with higher risk of coronary heart disease.   HDL stands for high density lipoprotein and refers to the so-called \"good\" cholesterol. HDL picks up excess cholesterol in the bloodstream and carries it back to the liver for disposal. Individuals with higher than average HDL seem to have a lower risk of coronary disease. Vigorous exercise will help increase the blood levels of HDL.   Risk Factor (Total cholesterol/HDL) is a commonly used ratio for cardiac risk assessment. Less than 5.0 for men and 4.4 for women is ideal.   Sodium is an electrolyte useful in diagnosis of dehydration, diabetes, hypertension, or other diseases involving electrolyte imbalance. It also preserves the balance " between calcium and potassium to maintain normal heart action and equilibrium of the body.   Potassium is also an electrolyte that works with sodium to regulate the body's water balance and normalize heart rhythm.   Glucose is a measure of blood sugar and is one of the tests for diabetes. If you have not been fasting, your level will often be high. A low glucose level may be a cause of weakness or dizziness. Blood sugar ranks with cholesterol as a causative factor in arteriosclerosis and heart attacks.   BUN & Creatinine are waste products excreted by the kidneys. A high BUN can be related to a high protein diet, heavy exercise, fever and infections, dehydration, kidney stones, and kidney disease. Creatinine elevation is less dependent on diet or exercise and better represents kidney impairment. Low values are not generally significant.   Calcium is a mineral in the blood controlled by the parathyroid glands and kidneys. It is important in the formation of bone, in muscle and nerve function, and in blood clotting. Disease of the parathyroid gland, diseased bones or kidneys, or defective absorption of calcium may cause abnormal levels from the intestine.   ALT, AST, Alk Phos are liver tests. Enzymes found in the liver as well as skeletal and cardiac muscle. Elevations can often be seen in alcoholism, liver or heart disease. Slightly abnormal values are not considered significant.   TSH stands for Thyroid Stimulating Hormone. A sensitive test used to determine how the thyroid gland is functioning. The thyroid gland produces hormones that control the body's metabolism. When too few hormones are produced (increased TSH), hypothyroidism occurs which can cause fatigue, sensitivity to cold, and weight gain - an overall slowing down of bodily functions. When too many thyroid hormones are produces (or decreased TSH), it creates a condition call hyperthyroidism (such as Graves' disease) which causes rapid heartbeat, weight  loss, and dizziness, among other symptoms.   PSA stands for Prostate Specific Antigen. Elevated levels of PSA can increase with trauma, infection, inflammation, or disease processes in the prostate such as BPH (Benigh Prostatic Hypertrophy) or cancer.   Glycohemoglobin or HgBA1C is a 3-month average of blood sugar

## 2024-05-24 NOTE — PROGRESS NOTES
5/24/2024      E-Consult has been accepted.     Interprofessional consultation requested by:  Viola Rome APRN CNP      Clinical Question/Purpose: MY CLINICAL QUESTION IS: undiagnosed CHAU?     Patient assessment and information reviewed: 52-year-old male (BMI 33) with habitual snoring, observed apneas without significant comorbidities.     Recommendations: Sleep medicine services will initiate home sleep testing and  60-minute sleep health and therapy determination visit        The recommendations provided in this E-Consult are based on a review of clinical data pertinent to the clinical question presented, without a review of the patient's complete medical record or, the benefit of a comprehensive in-person or virtual patient evaluation. This consultation should not replace the clinical judgement and evaluation of the provider ordering this E-Consult. Any new clinical issues, or changes in patient status since the filing of this E-Consult will need to be taken into account when assessing these recommendations. Please contact me if you have further questions.     My total time spent reviewing clinical information and formulating assessment was 10 minutes.         DARIUS HODGSON MD      Deleted by: Darius Hodgson MD at 5/24/2024  9:02 AM

## 2024-05-27 NOTE — RESULT ENCOUNTER NOTE
Pushpa Diaz    Your B12 is at the low end of normal. Recommend to start a B Complex supplement (over the counter) to see if that helps with energy. Please let us know if you have any questions.     Take care,    DEQUAN Perez CNP

## 2024-06-04 NOTE — TELEPHONE ENCOUNTER
PA Initiation    Medication: FREESTYLE EDILSON 14 DAY SENSOR [facility] [place]  Insurance Company: [employer] - Phone [phone] Fax [phone]  Pharmacy Filling the Rx: [facility] [place], [place] - [address]  Filling Pharmacy Phone: [phone]  Filling Pharmacy Fax:    Start Date: 6/4/2024

## 2024-06-06 NOTE — TELEPHONE ENCOUNTER
PRIOR AUTHORIZATION DENIED    Medication: [place] 14 [place] [facility] [place] [employer] - Phone [phone] Fax [phone]  Denial Date: 6/6/2024  Denial Reason(s): Insurance states that patient does not meet coverage criteria. Please see denial letter below for more details.     Appeal Information:   Patient Notified: NO    \

## 2024-06-27 ENCOUNTER — MYC REFILL (OUTPATIENT)
Dept: FAMILY MEDICINE | Facility: CLINIC | Age: 54
End: 2024-06-27
Payer: COMMERCIAL

## 2024-06-27 DIAGNOSIS — E11.9 TYPE 2 DIABETES MELLITUS WITHOUT COMPLICATION, WITHOUT LONG-TERM CURRENT USE OF INSULIN (H): ICD-10-CM

## 2024-06-28 NOTE — TELEPHONE ENCOUNTER
See MyChart message, pt requesting to increase Ozempic dose.    Patricia Gamble RN  [facility]

## 2024-12-16 DIAGNOSIS — E11.9 TYPE 2 DIABETES MELLITUS WITHOUT COMPLICATION, WITHOUT LONG-TERM CURRENT USE OF INSULIN (H): ICD-10-CM

## 2024-12-17 RX ORDER — METFORMIN HYDROCHLORIDE 500 MG/1
1500 TABLET, EXTENDED RELEASE ORAL
Qty: 90 TABLET | Refills: 0 | Status: SHIPPED | OUTPATIENT
Start: 2024-12-17

## 2024-12-29 ENCOUNTER — HEALTH MAINTENANCE LETTER (OUTPATIENT)
Age: 54
End: 2024-12-29

## 2025-01-02 ENCOUNTER — OFFICE VISIT (OUTPATIENT)
Dept: FAMILY MEDICINE | Facility: CLINIC | Age: 55
End: 2025-01-02
Payer: COMMERCIAL

## 2025-01-02 VITALS
RESPIRATION RATE: 16 BRPM | SYSTOLIC BLOOD PRESSURE: 136 MMHG | WEIGHT: 228 LBS | DIASTOLIC BLOOD PRESSURE: 75 MMHG | OXYGEN SATURATION: 98 % | HEART RATE: 90 BPM | HEIGHT: 73 IN | TEMPERATURE: 98.1 F | BODY MASS INDEX: 30.22 KG/M2

## 2025-01-02 DIAGNOSIS — L08.9 INFECTED SEBACEOUS CYST OF SKIN: Primary | ICD-10-CM

## 2025-01-02 DIAGNOSIS — L72.3 INFECTED SEBACEOUS CYST OF SKIN: Primary | ICD-10-CM

## 2025-01-02 DIAGNOSIS — E11.9 TYPE 2 DIABETES MELLITUS WITHOUT COMPLICATION, WITHOUT LONG-TERM CURRENT USE OF INSULIN (H): ICD-10-CM

## 2025-01-02 LAB
GRAM STAIN RESULT: ABNORMAL

## 2025-01-02 RX ORDER — CEPHALEXIN 500 MG/1
500 CAPSULE ORAL 4 TIMES DAILY
Qty: 28 CAPSULE | Refills: 0 | Status: SHIPPED | OUTPATIENT
Start: 2025-01-02 | End: 2025-01-09

## 2025-01-02 NOTE — PROGRESS NOTES
Assessment & Plan     Infected sebaceous cyst of skin    ASSESSMENT:  Sebaceous cyst with abscess formation.    PLAN:  After informed consent was obtained, using Betadine for cleansing   and 1% Lidocaine  with epinephrine for anesthetic, with sterile   technique, an incision was made into the abscess cavity which was   then drained of purulent material.  The cavity was irrigated. No packing needed.  Culture was obtained. Procedure well tolerated.  Dressing applied and wound care instructions provided.   Return to clinic prn for pain, increased swelling or fever.  Given history of type 2 diabetes and surrounding erythema, tristen start Keflex 4 times daily for 7 days.  - Cyst Aerobic Bacterial Culture Routine With Gram Stain  - cephALEXin (KEFLEX) 500 MG capsule; Take 1 capsule (500 mg) by mouth 4 times daily for 7 days.  Medication use and side effects discussed with the patient. Patient is in complete understanding and agreement with plan.     Type 2 diabetes mellitus without complication, without long-term current use of insulin (H)  As above        Sherri Diaz is a 54 year old, presenting for the following health issues:  Abscess        1/2/2025     9:36 AM   Additional Questions   Roomed by Mireille MESSINA CMA   Accompanied by Self     History of Present Illness       Reason for visit:  Boil or cyst on sternum    He eats 2-3 servings of fruits and vegetables daily.He consumes 0 sweetened beverage(s) daily.He exercises with enough effort to increase his heart rate 10 to 19 minutes per day.  He exercises with enough effort to increase his heart rate 4 days per week. He is missing 2 dose(s) of medications per week.  He is not taking prescribed medications regularly due to remembering to take.     Patient is a 54-year-old male with a history of type 2 diabetes.  He presents today for evaluation of a 1 week history of worsening boil/cyst on his central chest.  He states approximately 10 to 15 years ago had similar  "symptoms that improved with antibiotics.  However, over years she has noticed the cyst has returned.  Over the last week it has become red hot warm and swollen and has been draining to pus.  It is warm and red and tender to touch.  No fever or chills.  Does not check blood sugars however history of controlled diabetes in the past.              Objective    /75 (BP Location: Left arm, Patient Position: Sitting, Cuff Size: Adult Regular)   Pulse 90   Temp 98.1  F (36.7  C) (Oral)   Resp 16   Ht 1.854 m (6' 1\")   Wt 103.4 kg (228 lb)   SpO2 98%   BMI 30.08 kg/m    Body mass index is 30.08 kg/m .  Physical Exam   GENERAL: alert and no distress  SKIN: Approximate 3 x 3 cm cystic lesion noticed on central chest with purulent drainage and surrounding erythema present.  PSYCH: mentation appears normal, affect normal/bright          Signed Electronically by: Donald Berger PA-C    "

## 2025-01-06 LAB
BACTERIA FLD CULT: ABNORMAL
BACTERIA FLD CULT: ABNORMAL
GRAM STAIN RESULT: ABNORMAL

## 2025-04-23 ENCOUNTER — PATIENT OUTREACH (OUTPATIENT)
Dept: CARE COORDINATION | Facility: CLINIC | Age: 55
End: 2025-04-23
Payer: COMMERCIAL

## 2025-04-28 ENCOUNTER — TELEPHONE (OUTPATIENT)
Dept: FAMILY MEDICINE | Facility: CLINIC | Age: 55
End: 2025-04-28
Payer: COMMERCIAL

## 2025-04-28 ENCOUNTER — MYC MEDICAL ADVICE (OUTPATIENT)
Dept: FAMILY MEDICINE | Facility: CLINIC | Age: 55
End: 2025-04-28
Payer: COMMERCIAL

## 2025-04-28 NOTE — TELEPHONE ENCOUNTER
Patient Quality Outreach    Patient is due for the following:   Diabetes -  LDL (Fasting), Eye Exam, Microalbumin, Diabetic Follow-Up Visit, and Foot Exam  IVD  -  LDL (Fasting) and Statin  Physical Preventive Adult Physical,  - Due after 5/23/2025    Action(s) Taken:   Schedule a Annual Wellness Visit    Type of outreach:    Sent [employer] message.    Questions for provider review:    None         Ursula Casarez  Chart routed to None.

## 2025-04-28 NOTE — LETTER
May 6, 2025        Joe Lima  [id number] [place] [address]        Pushpa Diaz,     Your care team at [facility] values your health and well-being. After reviewing your chart, we have identified recommendation(s) to help you better manage your health.    It's time for your Annual Wellness visit. During your visit, we'll discuss your health, well-being, and any questions you may have related to preventive care. We'll also review any recommended tests, exams, or screenings you might need. To schedule please call [phone]) or use your [employer] account. Due May 23, 2025 or after.    It's time for a follow-up visit to manage your Diabetes, Diabetic Eye Exam. As part of your diabetes care, it's important to have a comprehensive eye exam with an optometrist each year to monitor your eye health and detect any changes early. You can schedule this appointment directly with your eye doctor. If you need a referral or assistance, please let us know.     If you recently had or are having any of these services soon, please contact the clinic via phone or [employer] so that your care team can update your records.    We look forward to seeing you at your upcoming visit.    If you have any questions or concerns, please contact our clinic. Thank you for continuing to trust us with your care.    Sincerely,    Your [facility] Care Team

## 2025-05-05 NOTE — TELEPHONE ENCOUNTER
Patient Quality Outreach    Patient is due for the following:   Physical Preventive Adult Physical    Action(s) Taken:   Schedule a Adult Preventative    Type of outreach:    Phone, left message for patient/parent to call back.    Questions for provider review:    None         Radha Rey  Chart routed to None.

## 2025-05-06 NOTE — TELEPHONE ENCOUNTER
Patient Quality Outreach    Patient is due for the following:   Diabetes -  LDL (Fasting), Eye Exam, Microalbumin, Diabetic Follow-Up Visit, and Foot Exam  IVD  -  LDL (Fasting) and Statin  Physical Preventive Adult Physical,  - Due after 5/23/2025    Action(s) Taken:   Schedule a Adult Preventative    Type of outreach:    Sent letter.    Questions for provider review:    None         Ursula Casarez  Chart routed to None.

## 2025-05-07 ENCOUNTER — PATIENT OUTREACH (OUTPATIENT)
Dept: CARE COORDINATION | Facility: CLINIC | Age: 55
End: 2025-05-07
Payer: COMMERCIAL

## 2025-07-12 ENCOUNTER — HEALTH MAINTENANCE LETTER (OUTPATIENT)
Age: 55
End: 2025-07-12

## 2025-08-02 ENCOUNTER — HEALTH MAINTENANCE LETTER (OUTPATIENT)
Age: 55
End: 2025-08-02

## 2025-08-12 ENCOUNTER — LAB (OUTPATIENT)
Dept: LAB | Facility: CLINIC | Age: 55
End: 2025-08-12
Payer: COMMERCIAL

## 2025-08-12 DIAGNOSIS — Z13.6 SCREENING FOR CARDIOVASCULAR CONDITION: ICD-10-CM

## 2025-08-12 DIAGNOSIS — E11.9 TYPE 2 DIABETES MELLITUS WITHOUT COMPLICATION, WITHOUT LONG-TERM CURRENT USE OF INSULIN (H): Primary | ICD-10-CM

## 2025-08-12 DIAGNOSIS — Z11.4 SCREENING FOR HIV (HUMAN IMMUNODEFICIENCY VIRUS): ICD-10-CM

## 2025-08-12 DIAGNOSIS — Z11.59 NEED FOR HEPATITIS C SCREENING TEST: ICD-10-CM
